# Patient Record
Sex: FEMALE | Race: WHITE | NOT HISPANIC OR LATINO | Employment: OTHER | URBAN - METROPOLITAN AREA
[De-identification: names, ages, dates, MRNs, and addresses within clinical notes are randomized per-mention and may not be internally consistent; named-entity substitution may affect disease eponyms.]

---

## 2018-09-25 ENCOUNTER — OFFICE VISIT (OUTPATIENT)
Dept: FAMILY MEDICINE | Facility: CLINIC | Age: 58
End: 2018-09-25
Payer: COMMERCIAL

## 2018-09-25 VITALS
HEART RATE: 69 BPM | DIASTOLIC BLOOD PRESSURE: 80 MMHG | TEMPERATURE: 98 F | RESPIRATION RATE: 16 BRPM | OXYGEN SATURATION: 98 % | HEIGHT: 67 IN | WEIGHT: 119.8 LBS | BODY MASS INDEX: 18.8 KG/M2 | SYSTOLIC BLOOD PRESSURE: 98 MMHG

## 2018-09-25 DIAGNOSIS — Z00.00 ENCOUNTER FOR GENERAL ADULT MEDICAL EXAMINATION WITHOUT ABNORMAL FINDINGS: Primary | ICD-10-CM

## 2018-09-25 DIAGNOSIS — I10 ESSENTIAL HYPERTENSION: ICD-10-CM

## 2018-09-25 DIAGNOSIS — E03.9 ACQUIRED HYPOTHYROIDISM: ICD-10-CM

## 2018-09-25 PROBLEM — D89.813: Status: ACTIVE | Noted: 2017-10-26

## 2018-09-25 PROBLEM — Z94.81 S/P ALLOGENEIC BONE MARROW TRANSPLANT (CMS/HCC): Status: ACTIVE | Noted: 2018-01-24

## 2018-09-25 PROCEDURE — 99396 PREV VISIT EST AGE 40-64: CPT | Performed by: FAMILY MEDICINE

## 2018-09-25 RX ORDER — ACYCLOVIR 800 MG/1
800 TABLET ORAL
COMMUNITY
Start: 2018-05-15 | End: 2019-06-29 | Stop reason: ALTCHOICE

## 2018-09-25 RX ORDER — HYDROCHLOROTHIAZIDE 25 MG/1
25 TABLET ORAL
COMMUNITY
Start: 2018-08-28 | End: 2019-06-29 | Stop reason: ALTCHOICE

## 2018-09-25 RX ORDER — LEVOTHYROXINE SODIUM 88 UG/1
TABLET ORAL
COMMUNITY
Start: 2015-09-09 | End: 2020-04-20 | Stop reason: SDUPTHER

## 2018-09-25 RX ORDER — LORAZEPAM 0.5 MG/1
0.5 TABLET ORAL
COMMUNITY
Start: 2016-07-01 | End: 2019-06-29 | Stop reason: ALTCHOICE

## 2018-09-25 RX ORDER — ANASTROZOLE 1 MG/1
TABLET ORAL
COMMUNITY
End: 2019-07-23 | Stop reason: ALTCHOICE

## 2018-09-25 RX ORDER — FLUTICASONE PROPIONATE 50 MCG
2 SPRAY, SUSPENSION (ML) NASAL
COMMUNITY
Start: 2017-11-22 | End: 2019-06-29 | Stop reason: ALTCHOICE

## 2018-09-25 RX ORDER — LANSOPRAZOLE 30 MG/1
30 CAPSULE, DELAYED RELEASE ORAL
COMMUNITY
Start: 2018-01-02 | End: 2019-06-29 | Stop reason: ALTCHOICE

## 2018-09-25 RX ORDER — LEVOTHYROXINE SODIUM 88 UG/1
88 TABLET ORAL DAILY
COMMUNITY
Start: 2018-03-30 | End: 2018-09-25 | Stop reason: SDUPTHER

## 2018-09-25 RX ORDER — LISINOPRIL 5 MG/1
5 TABLET ORAL
COMMUNITY
Start: 2017-11-09 | End: 2019-06-29 | Stop reason: ALTCHOICE

## 2018-09-25 ASSESSMENT — ENCOUNTER SYMPTOMS
CHILLS: 0
LIGHT-HEADEDNESS: 0
NERVOUS/ANXIOUS: 0
DYSURIA: 0
FREQUENCY: 0
NAUSEA: 0
DIZZINESS: 0
SLEEP DISTURBANCE: 0
FEVER: 0
DIARRHEA: 0
VOMITING: 0
ADENOPATHY: 0
SORE THROAT: 0
CONSTIPATION: 0
ARTHRALGIAS: 0
BACK PAIN: 0
FATIGUE: 1
RHINORRHEA: 0
COUGH: 0
EYE PAIN: 0
PALPITATIONS: 0
SHORTNESS OF BREATH: 0
HEADACHES: 0
WHEEZING: 0

## 2018-09-25 NOTE — ASSESSMENT & PLAN NOTE
Discontinue HCTZ, continue lisinopril 5 mg daily and check home BP regularly. If readings >140/90 then call.

## 2018-09-25 NOTE — PROGRESS NOTES
"Subjective      Patient ID: Toño Knight is a 58 y.o. female.  1960      HPI    Pt presents for wellness. She was last seen in 2015, found to have low WBC / platelet counts, worked up at [facility] and found to have MDS, had bone marrow transplant. 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      She has upcoming labs through Heme / Onc, including TSH.     Hypothyroidism treated with levothyroxine 88 mcg daily.     Home BP's have been low, 100s/70s. She is on HCTZ and lisinopril.     The following have been reviewed and updated as appropriate in this visit:  Allergies  Meds  Problems       Review of Systems   Constitutional: Positive for fatigue. Negative for chills and fever.   HENT: Negative for congestion, ear pain, rhinorrhea and sore throat.    Eyes: Negative for pain.   Respiratory: Negative for cough, shortness of breath and wheezing.    Cardiovascular: Negative for chest pain and palpitations.   Gastrointestinal: Negative for constipation, diarrhea, nausea and vomiting.   Genitourinary: Negative for dysuria and frequency.   Musculoskeletal: Negative for arthralgias and back pain.   Skin: Negative for rash.   Neurological: Negative for dizziness, light-headedness and headaches.   Hematological: Negative for adenopathy.   Psychiatric/Behavioral: Negative for behavioral problems and sleep disturbance. The patient is not nervous/anxious.        Objective     Vitals:    09/25/18 0817   BP: 98/80   Pulse: 69   Resp: 16   Temp: 36.7 °C (98 °F)   TempSrc: Oral   SpO2: 98%   Weight: 54.3 kg (119 lb 12.8 oz)   Height: 1.702 m (5' 7\")     Body mass index is 18.76 kg/m².    Physical Exam   Constitutional: She is oriented to person, place, and time. She appears well-developed and well-nourished. No distress.   HENT:   Head: Normocephalic and atraumatic.   Cardiovascular: Normal rate, regular rhythm, normal heart sounds and intact distal pulses.  Exam reveals no gallop and no friction rub.    No murmur " heard.  Pulmonary/Chest: Effort normal and breath sounds normal. No respiratory distress. She has no wheezes. She has no rales.   Musculoskeletal: She exhibits no edema or deformity.   Neurological: She is alert and oriented to person, place, and time. No cranial nerve deficit. Coordination normal.   Skin: No rash noted. She is not diaphoretic.   Psychiatric: She has a normal mood and affect. Her behavior is normal. Judgment and thought content normal.   Vitals reviewed.      Assessment/Plan   Problem List Items Addressed This Visit     Essential hypertension     Discontinue HCTZ, continue lisinopril 5 mg daily and check home BP regularly. If readings >140/90 then call.          Acquired hypothyroidism     Continue levothyroxine 88 mcg daily, send TSH labs to our office.           Other Visit Diagnoses     Encounter for general adult medical examination without abnormal findings    -  Primary      Colonoscopy up to date 2009, due next summer. S/P double mastectomy so no mammo due.    Schedule OB/GYN or with our office for PAP.     Have flu vaccine at Heme/Onc office or at our office.     Please have labs forwarded.

## 2018-09-25 NOTE — PATIENT INSTRUCTIONS
Colonoscopy up to date 2009, due next summer.     Schedule OB/GYN or with our office for PAP.     Have flu vaccine at Heme/Onc office or at our office. Also, recommend new shingles vaccine (Shingrix). Should discuss with Dr. Can.     Discontinue HCTZ, continue lisinopril 5 mg daily and check home BP regularly. If readings >140/90 then call.     Please have labs forwarded.

## 2019-01-31 ENCOUNTER — TELEPHONE (OUTPATIENT)
Dept: FAMILY MEDICINE | Facility: CLINIC | Age: 59
End: 2019-01-31

## 2019-06-29 ENCOUNTER — HOSPITAL ENCOUNTER (OUTPATIENT)
Facility: CLINIC | Age: 59
Discharge: HOME | End: 2019-06-29
Attending: EMERGENCY MEDICINE
Payer: COMMERCIAL

## 2019-06-29 VITALS
HEIGHT: 68 IN | RESPIRATION RATE: 16 BRPM | SYSTOLIC BLOOD PRESSURE: 180 MMHG | WEIGHT: 122 LBS | DIASTOLIC BLOOD PRESSURE: 90 MMHG | TEMPERATURE: 97.5 F | BODY MASS INDEX: 18.49 KG/M2 | HEART RATE: 68 BPM | OXYGEN SATURATION: 99 %

## 2019-06-29 DIAGNOSIS — N39.0 URINARY TRACT INFECTION IN FEMALE: Primary | ICD-10-CM

## 2019-06-29 LAB
BILIRUBIN, POC: NEGATIVE
BLOOD URINE, POC: ABNORMAL
CLARITY, POC: CLEAR
COLOR, POC: YELLOW
GLUCOSE URINE, POC: NEGATIVE
KETONES, POC: NEGATIVE
LEUKOCYTE EST, POC: ABNORMAL
NITRITE, POC: NEGATIVE
PH, POC: 6.5
PROTEIN, POC: NEGATIVE
SPECIFIC GRAVITY, POC: 1
UROBILINOGEN, POC: 0.2

## 2019-06-29 PROCEDURE — S9083 URGENT CARE CENTER GLOBAL: HCPCS | Performed by: EMERGENCY MEDICINE

## 2019-06-29 PROCEDURE — 81002 URINALYSIS NONAUTO W/O SCOPE: CPT | Performed by: EMERGENCY MEDICINE

## 2019-06-29 PROCEDURE — 99213 OFFICE O/P EST LOW 20 MIN: CPT | Performed by: EMERGENCY MEDICINE

## 2019-06-29 RX ORDER — NITROFURANTOIN 25; 75 MG/1; MG/1
100 CAPSULE ORAL 2 TIMES DAILY
Qty: 14 CAPSULE | Refills: 0 | Status: SHIPPED | OUTPATIENT
Start: 2019-06-29 | End: 2019-07-23 | Stop reason: ALTCHOICE

## 2019-06-29 ASSESSMENT — ENCOUNTER SYMPTOMS
ARTHRALGIAS: 0
VOMITING: 0
BACK PAIN: 0
NAUSEA: 0
FREQUENCY: 1
SHORTNESS OF BREATH: 0
COLOR CHANGE: 0
WEAKNESS: 0
COUGH: 0
PALPITATIONS: 0
ABDOMINAL PAIN: 0
ADENOPATHY: 0
EYE PAIN: 0
DYSURIA: 0
CHILLS: 0
HEMATURIA: 0
SORE THROAT: 0
FEVER: 0

## 2019-06-29 NOTE — ED PROVIDER NOTES
History  No chief complaint on file.    60 yo female presents with increased urinary frequency and urgency x 3 days. The patient says she has had the urge to go to the bathroom several times an hour. No dysuria or gross hematuria. No fevers/chills. She denies N/V. No back, flank, or abdominal pain. No vaginal bleeding or discharge. No new sexual partners. Symptoms are consistent with past UTIs. No other complaints.            No past medical history on file.    No past surgical history on file.    No family history on file.    Social History   Substance Use Topics   • Smoking status: Never Smoker   • Smokeless tobacco: Never Used   • Alcohol use No       Review of Systems   Constitutional: Negative for chills and fever.   HENT: Negative for ear pain and sore throat.    Eyes: Negative for pain and visual disturbance.   Respiratory: Negative for cough and shortness of breath.    Cardiovascular: Negative for chest pain and palpitations.   Gastrointestinal: Negative for abdominal pain, nausea and vomiting.   Genitourinary: Positive for frequency and urgency. Negative for dysuria and hematuria.   Musculoskeletal: Negative for arthralgias and back pain.   Skin: Negative for color change and rash.   Neurological: Negative for weakness.   Hematological: Negative for adenopathy.   All other systems reviewed and are negative.      Physical Exam  ED Triage Vitals   Temp Pulse Resp BP SpO2   -- -- -- -- --      Temp src Heart Rate Source Patient Position BP Location FiO2 (%) (Set)   -- -- -- -- --       Physical Exam   Constitutional: She appears well-developed and well-nourished. No distress.   HENT:   Head: Normocephalic and atraumatic.   Eyes: Conjunctivae are normal.   Neck: Neck supple.   Cardiovascular: Normal rate and regular rhythm.    No murmur heard.  Pulmonary/Chest: Effort normal and breath sounds normal. No respiratory distress.   Abdominal: Soft. There is no tenderness. There is no CVA tenderness.  "  Musculoskeletal: She exhibits no edema.   Neurological: She is alert.   Skin: Skin is warm and dry.   Psychiatric: She has a normal mood and affect.   Nursing note and vitals reviewed.        Procedures  Procedures    UC Course  Clinical Impressions as of Jun 29 1618   Urinary tract infection in female       MDM  Number of Diagnoses or Management Options  Urinary tract infection in female:   Diagnosis management comments: The patient is very well appearing with a benign exam. BP moderately elevated but she says she has \"white coat syndrome\" and always has elevated readings when she goes to the doctor's. Symptoms and urine dip are consistent with an uncomplicated UTI. Plan for a course of Macrobid and PCP follow up next week. The patient is agreeable to this plan. Strict return precautions provided.    Patient Progress  Patient progress: stable                 Don Parmar MD  06/29/19 0183    "

## 2019-07-23 ENCOUNTER — OFFICE VISIT (OUTPATIENT)
Dept: FAMILY MEDICINE | Facility: CLINIC | Age: 59
End: 2019-07-23
Payer: COMMERCIAL

## 2019-07-23 VITALS
WEIGHT: 122 LBS | OXYGEN SATURATION: 95 % | SYSTOLIC BLOOD PRESSURE: 138 MMHG | RESPIRATION RATE: 16 BRPM | HEART RATE: 65 BPM | DIASTOLIC BLOOD PRESSURE: 78 MMHG | BODY MASS INDEX: 18.49 KG/M2 | HEIGHT: 68 IN | TEMPERATURE: 97.9 F

## 2019-07-23 DIAGNOSIS — L23.89 ALLERGIC CONTACT DERMATITIS DUE TO OTHER AGENTS: Primary | ICD-10-CM

## 2019-07-23 PROBLEM — R53.81 PHYSICAL DECONDITIONING: Status: ACTIVE | Noted: 2018-09-27

## 2019-07-23 PROCEDURE — 99214 OFFICE O/P EST MOD 30 MIN: CPT | Performed by: FAMILY MEDICINE

## 2019-07-23 RX ORDER — PREDNISONE 20 MG/1
TABLET ORAL
Qty: 11 TABLET | Refills: 0 | Status: SHIPPED | OUTPATIENT
Start: 2019-07-23 | End: 2019-12-27

## 2019-07-23 RX ORDER — CALCIUM CARBONATE/VITAMIN D3 600 MG-10
1 TABLET ORAL
Refills: 1 | COMMUNITY
Start: 2019-04-17 | End: 2019-07-23 | Stop reason: ALTCHOICE

## 2019-07-23 RX ORDER — ERGOCALCIFEROL 1.25 MG/1
CAPSULE ORAL
Refills: 1 | COMMUNITY
Start: 2019-04-17 | End: 2019-07-23 | Stop reason: ALTCHOICE

## 2019-07-23 RX ORDER — HYDROXYZINE HYDROCHLORIDE 10 MG/1
10 TABLET, FILM COATED ORAL 3 TIMES DAILY PRN
Qty: 30 TABLET | Refills: 0 | Status: SHIPPED | OUTPATIENT
Start: 2019-07-23 | End: 2019-12-27

## 2019-07-23 NOTE — PROGRESS NOTES
Rash on the body for 3 days.   Rash History: can't think of anything new   It is localized to the abdomen, back and the arms.   It yes itchy.   It no painful.   Associated symptoms: none.   Redness? yes  Scaling? no    Rash exposures: none she can think of   Has take benadryl with no relief.   All other systems reviewed and negative except as noted in the HPI.   No past medical history on file.   Patient Active Problem List   Diagnosis   • Malignant neoplasm of breast (CMS/HCC) (HCC)   • Breast cancer (CMS/HCC) (HCC)   • Bacteremia   • Complication of bone marrow transplant (CMS/HCC) (HCC)   • Mjlhy-dgrkvc-ryiw disease (CMS/HCC) (HCC)   • Essential hypertension   • Hypoglobulinemia   • Acquired hypothyroidism   • Infection due to Enterobacter cloacae   • Liver lesion   • MDS (myelodysplastic syndrome) (CMS/HCC) (HCC)   • Neutropenia (CMS/HCC) (HCC)   • Osteopenia   • Other specified acquired hypothyroidism   • Pancytopenia (CMS/HCC) (HCC)   • S/P allogeneic bone marrow transplant (CMS/HCC) (HCC)   • Thrombocytopenia (CMS/HCC) (HCC)   • Thyroid disease   • Urinary tract infection without hematuria   • Vitamin D deficiency   • Physical deconditioning      No past surgical history on file.   No family history on file.   Social History     Social History   • Marital status: Single     Spouse name: N/A   • Number of children: N/A   • Years of education: N/A     Social History Main Topics   • Smoking status: Never Smoker   • Smokeless tobacco: Never Used   • Alcohol use No   • Drug use: Unknown   • Sexual activity: Not Asked     Other Topics Concern   • None     Social History Narrative   • None        Current Outpatient Prescriptions:   •  levothyroxine (SYNTHROID) 88 mcg tablet, take 1 tablet by oral route  every day, Disp: , Rfl:   •  hydrOXYzine (ATARAX) 10 mg tablet, Take 1 tablet (10 mg total) by mouth 3 (three) times a day as needed for itching for up to 10 days., Disp: 30 tablet, Rfl: 0  •  predniSONE (DELTASONE)  "20 mg tablet, Take 2 tabs x 3 days, take 1 tab x 3 days, take 1/2 tab x 4 days, Disp: 11 tablet, Rfl: 0   Allergies   Allergen Reactions   • No Known Drug Allergies         /78 (BP Location: Right upper arm, Patient Position: Sitting)   Pulse 65   Temp 36.6 °C (97.9 °F) (Temporal)   Resp 16   Ht 1.727 m (5' 8\")   Wt 55.3 kg (122 lb)   SpO2 95%   BMI 18.55 kg/m²    no apparent distress   Alert and well appearing   Awake and oriented with normal affect and appropriate behavior   HEENT: Normocephalic and atraumatic, pupils equal, round, and reactive to light, extraocular movements intact, OP clear with moist mucous membranes  Lungs: Normal breath sounds, lungs CTA with no RRW  Heart:Regular rate rhythm with no murmurs, gallops or rubs  Ext: No clubbing, cyanosis or edema   Rash description: multiple areas of macular papular lesion in linear clumps on arms b/l and scattered on back with dermatographia    1. Allergic contact dermatitis due to other agents  Patient will notify me with any questions or issues. Educated patient on newly prescribed medication including side effects. Patient verbalized understanding.  Treatment goals reviewed   Sarna  Follow up for worse symptoms.   - predniSONE (DELTASONE) 20 mg tablet; Take 2 tabs x 3 days, take 1 tab x 3 days, take 1/2 tab x 4 days  Dispense: 11 tablet; Refill: 0  - hydrOXYzine (ATARAX) 10 mg tablet; Take 1 tablet (10 mg total) by mouth 3 (three) times a day as needed for itching for up to 10 days.  Dispense: 30 tablet; Refill: 0            "

## 2019-10-17 ENCOUNTER — TRANSCRIBE ORDERS (OUTPATIENT)
Dept: SCHEDULING | Age: 59
End: 2019-10-17

## 2019-10-17 DIAGNOSIS — Z94.81 BONE MARROW TRANSPLANT STATUS (CMS/HCC): Primary | ICD-10-CM

## 2019-10-24 ENCOUNTER — HOSPITAL ENCOUNTER (OUTPATIENT)
Dept: RADIOLOGY | Facility: HOSPITAL | Age: 59
Discharge: HOME | End: 2019-10-24
Attending: INTERNAL MEDICINE
Payer: COMMERCIAL

## 2019-10-24 DIAGNOSIS — Z94.81 BONE MARROW TRANSPLANT STATUS (CMS/HCC): ICD-10-CM

## 2019-10-24 PROCEDURE — 77080 DXA BONE DENSITY AXIAL: CPT

## 2019-12-27 ENCOUNTER — OFFICE VISIT (OUTPATIENT)
Dept: FAMILY MEDICINE | Facility: CLINIC | Age: 59
End: 2019-12-27
Payer: COMMERCIAL

## 2019-12-27 VITALS
SYSTOLIC BLOOD PRESSURE: 118 MMHG | HEIGHT: 68 IN | HEART RATE: 72 BPM | BODY MASS INDEX: 18.64 KG/M2 | OXYGEN SATURATION: 99 % | TEMPERATURE: 97 F | RESPIRATION RATE: 16 BRPM | WEIGHT: 123 LBS | DIASTOLIC BLOOD PRESSURE: 70 MMHG

## 2019-12-27 DIAGNOSIS — C50.919 MALIGNANT NEOPLASM OF FEMALE BREAST, UNSPECIFIED ESTROGEN RECEPTOR STATUS, UNSPECIFIED LATERALITY, UNSPECIFIED SITE OF BREAST (CMS/HCC): ICD-10-CM

## 2019-12-27 DIAGNOSIS — Z00.00 ENCOUNTER FOR GENERAL ADULT MEDICAL EXAMINATION WITHOUT ABNORMAL FINDINGS: Primary | ICD-10-CM

## 2019-12-27 DIAGNOSIS — E03.9 ACQUIRED HYPOTHYROIDISM: ICD-10-CM

## 2019-12-27 DIAGNOSIS — Z94.81 S/P ALLOGENEIC BONE MARROW TRANSPLANT (CMS/HCC): ICD-10-CM

## 2019-12-27 DIAGNOSIS — D89.813: ICD-10-CM

## 2019-12-27 DIAGNOSIS — D46.9 MDS (MYELODYSPLASTIC SYNDROME) (CMS/HCC): ICD-10-CM

## 2019-12-27 DIAGNOSIS — T86.09 OTHER COMPLICATION OF BONE MARROW TRANSPLANT: ICD-10-CM

## 2019-12-27 DIAGNOSIS — E55.9 VITAMIN D DEFICIENCY: ICD-10-CM

## 2019-12-27 PROBLEM — R53.81 PHYSICAL DECONDITIONING: Status: RESOLVED | Noted: 2018-09-27 | Resolved: 2019-12-27

## 2019-12-27 PROBLEM — I10 ESSENTIAL HYPERTENSION: Status: RESOLVED | Noted: 2017-06-01 | Resolved: 2019-12-27

## 2019-12-27 PROCEDURE — 99396 PREV VISIT EST AGE 40-64: CPT | Performed by: FAMILY MEDICINE

## 2019-12-27 RX ORDER — ALENDRONATE SODIUM 70 MG/1
TABLET ORAL
Refills: 3 | COMMUNITY
Start: 2019-12-13

## 2019-12-27 ASSESSMENT — ENCOUNTER SYMPTOMS
COUGH: 0
WHEEZING: 0
SORE THROAT: 0
DYSURIA: 0
PALPITATIONS: 0
CONSTIPATION: 0
LIGHT-HEADEDNESS: 0
VOMITING: 0
SLEEP DISTURBANCE: 0
FATIGUE: 1
HEADACHES: 0
RHINORRHEA: 0
DIARRHEA: 0
DIZZINESS: 0
SHORTNESS OF BREATH: 0
NAUSEA: 0
CHILLS: 0
BACK PAIN: 0
FREQUENCY: 0
EYE PAIN: 0
ADENOPATHY: 0
ARTHRALGIAS: 0
FEVER: 0
NERVOUS/ANXIOUS: 0

## 2019-12-27 NOTE — PROGRESS NOTES
"Subjective      Patient ID: Toño Knight is a 59 y.o. female.  1960      HPI    Pt presents for wellness.     MDS-- had bone marrow transplant. She had graft-host-disease in 2017.     She has upcoming labs through Heme / Onc, including TSH.     Hypothyroidism treated with levothyroxine 88 mcg daily.      The following have been reviewed and updated as appropriate in this visit:  Allergies  Meds  Problems       Review of Systems   Constitutional: Positive for fatigue. Negative for chills and fever.   HENT: Negative for congestion, ear pain, rhinorrhea and sore throat.    Eyes: Negative for pain.   Respiratory: Negative for cough, shortness of breath and wheezing.    Cardiovascular: Negative for chest pain and palpitations.   Gastrointestinal: Negative for constipation, diarrhea, nausea and vomiting.   Genitourinary: Negative for dysuria and frequency.   Musculoskeletal: Negative for arthralgias and back pain.   Skin: Negative for rash.   Neurological: Negative for dizziness, light-headedness and headaches.   Hematological: Negative for adenopathy.   Psychiatric/Behavioral: Negative for behavioral problems and sleep disturbance. The patient is not nervous/anxious.        Objective     Vitals:    12/27/19 1053   BP: 118/70   BP Location: Right upper arm   Patient Position: Sitting   Pulse: 72   Resp: 16   Temp: 36.1 °C (97 °F)   TempSrc: Temporal   SpO2: 99%   Weight: 55.8 kg (123 lb)   Height: 1.727 m (5' 8\")     Body mass index is 18.7 kg/m².    Physical Exam   Constitutional: She is oriented to person, place, and time. She appears well-developed and well-nourished. No distress.   HENT:   Head: Normocephalic and atraumatic.   Cardiovascular: Normal rate, regular rhythm, normal heart sounds and intact distal pulses. Exam reveals no gallop and no friction rub.   No murmur heard.  Pulmonary/Chest: Effort normal and breath sounds normal. No respiratory distress. She has no wheezes. She has no rales. "   Musculoskeletal: She exhibits no edema or deformity.   Neurological: She is alert and oriented to person, place, and time. No cranial nerve deficit. Coordination normal.   Skin: No rash noted. She is not diaphoretic.   Psychiatric: She has a normal mood and affect. Her behavior is normal. Judgment and thought content normal.   Vitals reviewed.      Assessment/Plan   Problem List Items Addressed This Visit        Digestive    Vitamin D deficiency    Relevant Orders    Vitamin D 25 hydroxy       Endocrine/Metabolic    Acquired hypothyroidism    Relevant Orders    TSH 3rd Generation       Hematologic    Complication of bone marrow transplant (CMS/HCC)    MDS (myelodysplastic syndrome) (CMS/HCC)     Stable, routine labs checked through heme/onc every 6 months.            Immune    Mlkug-gnmhrn-eknt disease (CMS/HCC)     Stable.            Other    Malignant neoplasm of breast (CMS/HCC)     Stable, s/p b/l mastectomy         S/P allogeneic bone marrow transplant (CMS/HCC)     Continue heme /onc visits.           Other Visit Diagnoses     Encounter for general adult medical examination without abnormal findings    -  Primary    Relevant Orders    Lipid panel    Comprehensive metabolic panel    CBC and Differential      Colonoscopy up to date 2019. S/P double mastectomy so no mammo due.    Will repeat labs / Thyroid / lipids. Hep C neg 2017.     Up to date with OB/GYN, PAP / DEXA done.    Declined flu vaccine this year.

## 2019-12-31 LAB
25(OH)D3 SERPL-MCNC: 32 NG/ML (ref 30–100)
ALBUMIN SERPL-MCNC: 4.2 G/DL (ref 3.6–5.1)
ALBUMIN/GLOB SERPL: 2.5 (CALC) (ref 1–2.5)
ALP SERPL-CCNC: 72 U/L (ref 33–130)
ALT SERPL-CCNC: 15 U/L (ref 6–29)
AST SERPL-CCNC: 18 U/L (ref 10–35)
BASOPHILS # BLD AUTO: 73 CELLS/UL (ref 0–200)
BASOPHILS NFR BLD AUTO: 1.1 %
BILIRUB SERPL-MCNC: 0.5 MG/DL (ref 0.2–1.2)
BUN SERPL-MCNC: 19 MG/DL (ref 7–25)
BUN/CREAT SERPL: 18 (CALC) (ref 6–22)
CALCIUM SERPL-MCNC: 9.1 MG/DL (ref 8.6–10.4)
CHLORIDE SERPL-SCNC: 105 MMOL/L (ref 98–110)
CHOLEST SERPL-MCNC: 227 MG/DL
CHOLEST/HDLC SERPL: 4.5 (CALC)
CO2 SERPL-SCNC: 28 MMOL/L (ref 20–32)
CREAT SERPL-MCNC: 1.08 MG/DL (ref 0.5–0.99)
EOSINOPHIL # BLD AUTO: 310 CELLS/UL (ref 15–500)
EOSINOPHIL NFR BLD AUTO: 4.7 %
ERYTHROCYTE [DISTWIDTH] IN BLOOD BY AUTOMATED COUNT: 12.8 % (ref 11–15)
GLOBULIN SER CALC-MCNC: 1.7 G/DL (CALC) (ref 1.9–3.7)
GLUCOSE SERPL-MCNC: 82 MG/DL (ref 65–99)
HCT VFR BLD AUTO: 37.4 % (ref 35–45)
HDLC SERPL-MCNC: 51 MG/DL
HGB BLD-MCNC: 12.9 G/DL (ref 11.7–15.5)
LDLC SERPL CALC-MCNC: 159 MG/DL (CALC)
LYMPHOCYTES # BLD AUTO: 1003 CELLS/UL (ref 850–3900)
LYMPHOCYTES NFR BLD AUTO: 15.2 %
MCH RBC QN AUTO: 31.9 PG (ref 27–33)
MCHC RBC AUTO-ENTMCNC: 34.5 G/DL (ref 32–36)
MCV RBC AUTO: 92.3 FL (ref 80–100)
MONOCYTES # BLD AUTO: 640 CELLS/UL (ref 200–950)
MONOCYTES NFR BLD AUTO: 9.7 %
NEUTROPHILS # BLD AUTO: 4574 CELLS/UL (ref 1500–7800)
NEUTROPHILS NFR BLD AUTO: 69.3 %
NONHDLC SERPL-MCNC: 176 MG/DL (CALC)
PLATELET # BLD AUTO: 263 THOUSAND/UL (ref 140–400)
PMV BLD REES-ECKER: 8.6 FL (ref 7.5–12.5)
POTASSIUM SERPL-SCNC: 4.2 MMOL/L (ref 3.5–5.3)
PROT SERPL-MCNC: 5.9 G/DL (ref 6.1–8.1)
QUEST EGFR NON-AFR. AMERICAN: 56 ML/MIN/1.73M2
RBC # BLD AUTO: 4.05 MILLION/UL (ref 3.8–5.1)
SODIUM SERPL-SCNC: 140 MMOL/L (ref 135–146)
TRIGL SERPL-MCNC: 71 MG/DL
TSH SERPL-ACNC: 1.18 MIU/L (ref 0.4–4.5)
WBC # BLD AUTO: 6.6 THOUSAND/UL (ref 3.8–10.8)

## 2019-12-31 NOTE — RESULT ENCOUNTER NOTE
Toño,    Your blood work shows elevated cholesterol at 227 total, mildly abnormal kidney function, may be due to fasting with some mild dehydration.  Vitamin D is in low normal range, thyroid level is fine, blood counts all normal.  Recommend watching cholesterol foods and trying to limit those as much as possible.  We can follow-up on the cholesterol and labs again next year.  Take care.    Dr. Mcwilliams

## 2020-02-19 ENCOUNTER — OFFICE VISIT (OUTPATIENT)
Dept: FAMILY MEDICINE | Facility: CLINIC | Age: 60
End: 2020-02-19
Payer: COMMERCIAL

## 2020-02-19 VITALS
WEIGHT: 122 LBS | SYSTOLIC BLOOD PRESSURE: 96 MMHG | BODY MASS INDEX: 18.49 KG/M2 | OXYGEN SATURATION: 99 % | HEIGHT: 68 IN | TEMPERATURE: 99.4 F | HEART RATE: 75 BPM | DIASTOLIC BLOOD PRESSURE: 70 MMHG | RESPIRATION RATE: 16 BRPM

## 2020-02-19 DIAGNOSIS — J10.1 INFLUENZA A: Primary | ICD-10-CM

## 2020-02-19 LAB
RAPID INFLUENZA A AGN: POSITIVE
RAPID INFLUENZA B AGN: NEGATIVE

## 2020-02-19 PROCEDURE — 87804 INFLUENZA ASSAY W/OPTIC: CPT | Performed by: FAMILY MEDICINE

## 2020-02-19 PROCEDURE — 99214 OFFICE O/P EST MOD 30 MIN: CPT | Performed by: FAMILY MEDICINE

## 2020-02-19 RX ORDER — OSELTAMIVIR PHOSPHATE 75 MG/1
75 CAPSULE ORAL 2 TIMES DAILY
Qty: 10 CAPSULE | Refills: 0 | Status: SHIPPED | OUTPATIENT
Start: 2020-02-19 | End: 2020-02-24

## 2020-02-19 ASSESSMENT — ENCOUNTER SYMPTOMS
NAUSEA: 1
DYSURIA: 0
VOMITING: 0
RHINORRHEA: 0
BACK PAIN: 0
PALPITATIONS: 0
WHEEZING: 0
CONSTIPATION: 0
CHILLS: 1
FEVER: 1
HEADACHES: 0
FATIGUE: 1
COUGH: 1
LIGHT-HEADEDNESS: 0
DIARRHEA: 0
DIZZINESS: 0
ADENOPATHY: 0
NERVOUS/ANXIOUS: 0
SHORTNESS OF BREATH: 0
EYE PAIN: 0
SORE THROAT: 0
ARTHRALGIAS: 0
SLEEP DISTURBANCE: 0
FREQUENCY: 0

## 2020-02-19 NOTE — PROGRESS NOTES
"Subjective      Patient ID: Toño Knight is a 60 y.o. female.  1960      HPI    Patient presents due to fever and body aches for the past 2 to 3 days.  She has been increasing fluids, taking Tylenol as needed.    The following have been reviewed and updated as appropriate in this visit:       Review of Systems   Constitutional: Positive for chills, fatigue and fever.   HENT: Positive for congestion. Negative for ear pain, rhinorrhea and sore throat.    Eyes: Negative for pain.   Respiratory: Positive for cough. Negative for shortness of breath and wheezing.    Cardiovascular: Negative for chest pain and palpitations.   Gastrointestinal: Positive for nausea. Negative for constipation, diarrhea and vomiting.   Genitourinary: Negative for dysuria and frequency.   Musculoskeletal: Negative for arthralgias and back pain.   Skin: Negative for rash.   Neurological: Negative for dizziness, light-headedness and headaches.   Hematological: Negative for adenopathy.   Psychiatric/Behavioral: Negative for behavioral problems and sleep disturbance. The patient is not nervous/anxious.        Objective     Vitals:    02/19/20 1340   BP: 96/70   BP Location: Left upper arm   Patient Position: Sitting   Pulse: 75   Resp: 16   Temp: 37.4 °C (99.4 °F)   TempSrc: Oral   SpO2: 99%   Weight: 55.3 kg (122 lb)   Height: 1.727 m (5' 8\")     Body mass index is 18.55 kg/m².    Physical Exam   Constitutional: She is oriented to person, place, and time. She appears well-developed and well-nourished. She appears lethargic. She appears ill. No distress.   HENT:   Head: Normocephalic and atraumatic.   Right Ear: Hearing, tympanic membrane, external ear and ear canal normal.   Left Ear: Hearing, tympanic membrane, external ear and ear canal normal.   Nose: No mucosal edema. Right sinus exhibits no maxillary sinus tenderness and no frontal sinus tenderness. Left sinus exhibits no maxillary sinus tenderness and no frontal sinus tenderness. "   Mouth/Throat: No oropharyngeal exudate or posterior oropharyngeal erythema.   Eyes: Pupils are equal, round, and reactive to light. Conjunctivae and EOM are normal. Right eye exhibits no discharge. Left eye exhibits no discharge.   Neck: Normal range of motion. Neck supple.   Cardiovascular: Normal rate, regular rhythm, normal heart sounds and intact distal pulses. Exam reveals no gallop and no friction rub.   No murmur heard.  Pulmonary/Chest: Effort normal and breath sounds normal. No respiratory distress. She has no wheezes. She has no rales.   Musculoskeletal: She exhibits no edema or deformity.   Lymphadenopathy:     She has no cervical adenopathy.   Neurological: She is oriented to person, place, and time. She appears lethargic. No cranial nerve deficit. Coordination normal.   Skin: No rash noted. She is not diaphoretic.   Psychiatric: She has a normal mood and affect. Her behavior is normal. Judgment and thought content normal.   Vitals reviewed.      Assessment/Plan   Diagnoses and all orders for this visit:    Influenza A (Primary)  -     POCT Influenza A/B    Other orders  -     oseltamivir (TAMIFLU) 75 mg capsule; Take 1 capsule (75 mg total) by mouth 2 (two) times a day for 5 days.      Flu A swab positive in office today.    We will start Tamiflu since between 48 and 72-hour window and medical history of MDS.  Patient agreeable with plan and we discussed side effects.  Continue with hydration, Tylenol as needed.

## 2020-10-13 RX ORDER — LEVOTHYROXINE SODIUM 88 UG/1
TABLET ORAL
Qty: 90 TABLET | Refills: 0 | Status: SHIPPED | OUTPATIENT
Start: 2020-10-13 | End: 2020-11-24

## 2020-10-13 NOTE — TELEPHONE ENCOUNTER
Medicine Refill Request    Last Office Visit: 2/19/2020  Last Telemedicine Visit: Visit date not found    Next Office Visit: Visit date not found  Next Telemedicine Visit: Visit date not found         Current Outpatient Medications:   •  alendronate (FOSAMAX) 70 mg tablet, TAKE 1 TABLET ONCE A WEEK, Disp: , Rfl: 3  •  levothyroxine (SYNTHROID) 88 mcg tablet, Take 1 tablet (88 mcg total) by mouth daily., Disp: 90 tablet, Rfl: 1      BP Readings from Last 3 Encounters:   02/19/20 96/70   12/27/19 118/70   07/23/19 138/78       Recent Lab results:  Lab Results   Component Value Date    CHOL 227 (H) 12/30/2019   ,   Lab Results   Component Value Date    HDL 51 12/30/2019   ,   Lab Results   Component Value Date    LDLCALC 159 (H) 12/30/2019   ,   Lab Results   Component Value Date    TRIG 71 12/30/2019        Lab Results   Component Value Date    GLUCOSE 82 12/30/2019   , No results found for: HGBA1C      Lab Results   Component Value Date    CREATININE 1.08 (H) 12/30/2019       Lab Results   Component Value Date    TSH 1.18 12/30/2019

## 2020-10-26 ENCOUNTER — DOCUMENTATION (OUTPATIENT)
Dept: FAMILY MEDICINE | Facility: CLINIC | Age: 60
End: 2020-10-26

## 2020-10-26 NOTE — PROGRESS NOTES
Chrissy Barnett MA has completed a chart review for Toño GONZALEZ Bath and have determined that the care gap has been satisfied.    Care Gap Source:: [facility]    Care Gap(s) Identified:: Breast Cancer Screening    Chart Review Completed:: Yes     Patient is exempt from this measure, she had a mastectomy in 2005.

## 2020-11-24 RX ORDER — LEVOTHYROXINE SODIUM 88 UG/1
TABLET ORAL
Qty: 90 TABLET | Refills: 0 | Status: SHIPPED | OUTPATIENT
Start: 2020-11-24 | End: 2021-02-22

## 2020-12-31 ENCOUNTER — OFFICE VISIT (OUTPATIENT)
Dept: FAMILY MEDICINE | Facility: CLINIC | Age: 60
End: 2020-12-31
Payer: COMMERCIAL

## 2020-12-31 VITALS
WEIGHT: 117.5 LBS | HEIGHT: 68 IN | TEMPERATURE: 96.6 F | OXYGEN SATURATION: 98 % | BODY MASS INDEX: 17.81 KG/M2 | SYSTOLIC BLOOD PRESSURE: 118 MMHG | RESPIRATION RATE: 14 BRPM | DIASTOLIC BLOOD PRESSURE: 86 MMHG | HEART RATE: 80 BPM

## 2020-12-31 DIAGNOSIS — E78.00 ELEVATED CHOLESTEROL: ICD-10-CM

## 2020-12-31 DIAGNOSIS — Z13.1 SCREENING FOR DIABETES MELLITUS: ICD-10-CM

## 2020-12-31 DIAGNOSIS — E55.9 VITAMIN D DEFICIENCY: ICD-10-CM

## 2020-12-31 DIAGNOSIS — Z00.00 WELL ADULT EXAM: Primary | ICD-10-CM

## 2020-12-31 DIAGNOSIS — Z12.11 SCREENING FOR COLON CANCER: ICD-10-CM

## 2020-12-31 DIAGNOSIS — E03.9 ACQUIRED HYPOTHYROIDISM: ICD-10-CM

## 2020-12-31 DIAGNOSIS — D46.9 MDS (MYELODYSPLASTIC SYNDROME) (CMS/HCC): ICD-10-CM

## 2020-12-31 PROCEDURE — 99396 PREV VISIT EST AGE 40-64: CPT | Performed by: NURSE PRACTITIONER

## 2020-12-31 NOTE — PROGRESS NOTES
"SUBJECTIVE:   60 y.o. female for annual routine physical, no GYN  Current Outpatient Medications   Medication Sig Dispense Refill   • alendronate (FOSAMAX) 70 mg tablet TAKE 1 TABLET ONCE A WEEK  3   • levothyroxine (SYNTHROID) 88 mcg tablet TAKE 1 TABLET BY MOUTH EVERY DAY 90 tablet 0     No current facility-administered medications for this visit.      Allergies: No known drug allergies   No LMP recorded. Patient is postmenopausal.    New Concerns: recent melanoma removed right calf, had it excised and next appt is Feb  Other doctors:  Oncologist Dr. Ginger Can- appt next week for a check up for MDS originally diagnosed 5 years ago  S/p B/L breast cancer 20 years ago, had B/L mastectomy, chemo and radiation (treated by Dr. Jansen)  Dr. Mariana Miller- derm  Gyn in [facility] saw her and had last pap 2019  Dr. Welch for osteoporosis    Review of risk factors for depression:   Over the past 2 weeks, have you felt down, depressed or hopeless? no  Over the past 2 weeks, have you felt little interest or pleasure in doing things? no     ROS:  Feeling well. No dyspnea or chest pain on exertion.  No abdominal pain, change in bowel habits, black or bloody stools.  No urinary tract symptoms.     Weight: The patient reports no significant weight change.  Diet: healthy balanced diet  Exercise: three times/week  Type: walks in good weather     OBJECTIVE:  Visit Vitals  BP (!) 118/92 (BP Location: Right upper arm, Patient Position: Sitting)   Pulse (!) 104   Temp (!) 35.9 °C (96.6 °F) (Temporal)   Resp 14   Ht 1.715 m (5' 7.5\")   Wt 53.3 kg (117 lb 8 oz)   SpO2 98%   BMI 18.13 kg/m²      Gait:  Normal  Alert and well appearing   Awake and oriented with normal affect and appropriate behavior   HEENT: Normocephalic and atraumatic, pupils equal, round, OP clear with moist mucous membranes  Neck:  supple, no thyroid enlargement, no LAD  Lungs: Normal breath sounds, lungs CTA with no RRW  Heart:Regular rate rhythm with no " murmurs, gallops or rubs   Abd: Soft, non-tender, normal bowel sounds; no bruits, organomegaly or masses.  Ext: No clubbing, cyanosis or edema   Neuro: Gait normal, DTR 2/4 Patellar bilaterally, CN II-XII intact  + DP pulses  Skin:  No lesions on exposed skin    orders written for new lab studies as appropriate; see orders.       Preventive Services:   Adult DTaP: 2018  Pneumovax: UTD   Flu Vaccine advised: Fall/Now/UTD: UTD   Zostervax/Shingrix: has not had it and wants to check with Dr. Can before getting it       Health Maintenance up to date:  Dexascan:  yes  2019  Colonoscopy:  yes  Had one at age 59 - three polyps removed, next colonoscopy at age 64  Mammogram:  no    Anticipatory Guidance   Safe Sex/STD's yes  Smoking Cessation yes  Sunscreen/ABCDs yes  Diet/Exercise yes  Recommend routing opth & dental care  yes      Diagnosis Plan   1. Well adult exam     2. Acquired hypothyroidism  TSH    TSH   3. Vitamin D deficiency  Vitamin D 25 hydroxy    Vitamin D 25 hydroxy   4. Elevated cholesterol  Lipid panel    Lipid panel   5. Screening for diabetes mellitus  Comprehensive metabolic panel    Comprehensive metabolic panel    FIT    FIT   6. Screening for colon cancer       Check routine labs  Check FIT test  Follow up with specialists  Stay active and follow a low fat diet  Recommend Vit D supplement but will wait for labs to see level

## 2021-01-01 LAB
25(OH)D3 SERPL-MCNC: 25 NG/ML (ref 30–100)
ALBUMIN SERPL-MCNC: 4.4 G/DL (ref 3.6–5.1)
ALBUMIN/GLOB SERPL: 2.4 (CALC) (ref 1–2.5)
ALP SERPL-CCNC: 49 U/L (ref 37–153)
ALT SERPL-CCNC: 11 U/L (ref 6–29)
AST SERPL-CCNC: 15 U/L (ref 10–35)
BASOPHILS # BLD AUTO: 61 CELLS/UL (ref 0–200)
BASOPHILS NFR BLD AUTO: 0.8 %
BILIRUB SERPL-MCNC: 0.5 MG/DL (ref 0.2–1.2)
BUN SERPL-MCNC: 13 MG/DL (ref 7–25)
BUN/CREAT SERPL: ABNORMAL (CALC) (ref 6–22)
CALCIUM SERPL-MCNC: 9.8 MG/DL (ref 8.6–10.4)
CHLORIDE SERPL-SCNC: 104 MMOL/L (ref 98–110)
CHOLEST SERPL-MCNC: 228 MG/DL
CHOLEST/HDLC SERPL: 4.5 (CALC)
CO2 SERPL-SCNC: 27 MMOL/L (ref 20–32)
CREAT SERPL-MCNC: 0.87 MG/DL (ref 0.5–0.99)
EOSINOPHIL # BLD AUTO: 84 CELLS/UL (ref 15–500)
EOSINOPHIL NFR BLD AUTO: 1.1 %
ERYTHROCYTE [DISTWIDTH] IN BLOOD BY AUTOMATED COUNT: 13.1 % (ref 11–15)
GLOBULIN SER CALC-MCNC: 1.8 G/DL (CALC) (ref 1.9–3.7)
GLUCOSE SERPL-MCNC: 95 MG/DL (ref 65–99)
HCT VFR BLD AUTO: 40.2 % (ref 35–45)
HDLC SERPL-MCNC: 51 MG/DL
HGB BLD-MCNC: 13.3 G/DL (ref 11.7–15.5)
LDLC SERPL CALC-MCNC: 157 MG/DL (CALC)
LYMPHOCYTES # BLD AUTO: 1056 CELLS/UL (ref 850–3900)
LYMPHOCYTES NFR BLD AUTO: 13.9 %
MCH RBC QN AUTO: 31.1 PG (ref 27–33)
MCHC RBC AUTO-ENTMCNC: 33.1 G/DL (ref 32–36)
MCV RBC AUTO: 94.1 FL (ref 80–100)
MONOCYTES # BLD AUTO: 562 CELLS/UL (ref 200–950)
MONOCYTES NFR BLD AUTO: 7.4 %
NEUTROPHILS # BLD AUTO: 5837 CELLS/UL (ref 1500–7800)
NEUTROPHILS NFR BLD AUTO: 76.8 %
NONHDLC SERPL-MCNC: 177 MG/DL (CALC)
PLATELET # BLD AUTO: 278 THOUSAND/UL (ref 140–400)
PMV BLD REES-ECKER: 8.7 FL (ref 7.5–12.5)
POTASSIUM SERPL-SCNC: 4.8 MMOL/L (ref 3.5–5.3)
PROT SERPL-MCNC: 6.2 G/DL (ref 6.1–8.1)
QUEST EGFR NON-AFR. AMERICAN: 72 ML/MIN/1.73M2
RBC # BLD AUTO: 4.27 MILLION/UL (ref 3.8–5.1)
SODIUM SERPL-SCNC: 140 MMOL/L (ref 135–146)
TRIGL SERPL-MCNC: 96 MG/DL
TSH SERPL-ACNC: 0.09 MIU/L (ref 0.4–4.5)
WBC # BLD AUTO: 7.6 THOUSAND/UL (ref 3.8–10.8)

## 2021-01-28 LAB — HEMOCCULT STL QL IA: NORMAL

## 2021-02-22 RX ORDER — LEVOTHYROXINE SODIUM 88 UG/1
TABLET ORAL
Qty: 90 TABLET | Refills: 0 | Status: SHIPPED | OUTPATIENT
Start: 2021-02-22 | End: 2021-04-14 | Stop reason: SDUPTHER

## 2021-04-14 DIAGNOSIS — Z23 ENCOUNTER FOR IMMUNIZATION: ICD-10-CM

## 2021-09-24 ENCOUNTER — TELEPHONE (OUTPATIENT)
Dept: FAMILY MEDICINE | Facility: CLINIC | Age: 61
End: 2021-09-24

## 2021-09-24 NOTE — TELEPHONE ENCOUNTER
Pt does not want to wait until December for physical with Dr. Mcwilliams.    She has new insurance    Last EPP 12.31.20

## 2021-09-29 ENCOUNTER — TRANSCRIBE ORDERS (OUTPATIENT)
Dept: SCHEDULING | Age: 61
End: 2021-09-29
Payer: COMMERCIAL

## 2021-09-29 DIAGNOSIS — Z78.0 ASYMPTOMATIC MENOPAUSAL STATE: Primary | ICD-10-CM

## 2021-09-29 DIAGNOSIS — M81.8 OTHER OSTEOPOROSIS WITHOUT CURRENT PATHOLOGICAL FRACTURE: ICD-10-CM

## 2021-10-12 ENCOUNTER — OFFICE VISIT (OUTPATIENT)
Dept: FAMILY MEDICINE | Facility: CLINIC | Age: 61
End: 2021-10-12
Payer: COMMERCIAL

## 2021-10-12 VITALS
TEMPERATURE: 97.7 F | RESPIRATION RATE: 13 BRPM | HEIGHT: 68 IN | BODY MASS INDEX: 18.04 KG/M2 | DIASTOLIC BLOOD PRESSURE: 90 MMHG | OXYGEN SATURATION: 99 % | SYSTOLIC BLOOD PRESSURE: 168 MMHG | HEART RATE: 65 BPM | WEIGHT: 119 LBS

## 2021-10-12 DIAGNOSIS — C50.919 MALIGNANT NEOPLASM OF FEMALE BREAST, UNSPECIFIED ESTROGEN RECEPTOR STATUS, UNSPECIFIED LATERALITY, UNSPECIFIED SITE OF BREAST (CMS/HCC): ICD-10-CM

## 2021-10-12 DIAGNOSIS — D46.9 MDS (MYELODYSPLASTIC SYNDROME) (CMS/HCC): ICD-10-CM

## 2021-10-12 DIAGNOSIS — Z00.00 ENCOUNTER FOR GENERAL ADULT MEDICAL EXAMINATION WITHOUT ABNORMAL FINDINGS: Primary | ICD-10-CM

## 2021-10-12 DIAGNOSIS — Z23 FLU VACCINE NEED: ICD-10-CM

## 2021-10-12 DIAGNOSIS — E03.9 ACQUIRED HYPOTHYROIDISM: ICD-10-CM

## 2021-10-12 PROCEDURE — 99396 PREV VISIT EST AGE 40-64: CPT | Mod: 25 | Performed by: FAMILY MEDICINE

## 2021-10-12 PROCEDURE — 90686 IIV4 VACC NO PRSV 0.5 ML IM: CPT | Performed by: FAMILY MEDICINE

## 2021-10-12 PROCEDURE — 3008F BODY MASS INDEX DOCD: CPT | Performed by: FAMILY MEDICINE

## 2021-10-12 PROCEDURE — 90471 IMMUNIZATION ADMIN: CPT | Performed by: FAMILY MEDICINE

## 2021-10-12 RX ORDER — LEVOTHYROXINE SODIUM 75 UG/1
75 TABLET ORAL
Qty: 90 TABLET | Refills: 3 | Status: SHIPPED | OUTPATIENT
Start: 2021-10-12 | End: 2022-10-05

## 2021-10-12 RX ORDER — VIT C/E/ZN/COPPR/LUTEIN/ZEAXAN 250MG-90MG
1000 CAPSULE ORAL DAILY
COMMUNITY

## 2021-10-12 ASSESSMENT — ENCOUNTER SYMPTOMS
BACK PAIN: 0
NERVOUS/ANXIOUS: 0
EYE PAIN: 0
DIZZINESS: 0
HEADACHES: 0
PALPITATIONS: 0
FREQUENCY: 0
SLEEP DISTURBANCE: 0
CHILLS: 0
DIARRHEA: 0
NAUSEA: 0
DYSURIA: 0
SORE THROAT: 0
ADENOPATHY: 0
SHORTNESS OF BREATH: 0
RHINORRHEA: 0
WHEEZING: 0
COUGH: 0
LIGHT-HEADEDNESS: 0
FEVER: 0
VOMITING: 0
CONSTIPATION: 0

## 2021-10-12 NOTE — PROGRESS NOTES
"      Subjective      Patient ID: Toño Knight is a 61 y.o. female.  1960      Pt presents for a physical exam.  She is going to see a cardiologist to follow-up on her transplant surgery. Her home BP readings are less than 140 systolic. Pt walks at a good pace for 40 minutes, 3 or 4 days out of the week and has lost 3 pounds.      The following have been reviewed and updated as appropriate in this visit:  Allergies  Meds  Problems       Review of Systems   Constitutional: Negative for chills and fever.   HENT: Negative for congestion, ear pain, rhinorrhea and sore throat.    Eyes: Negative for pain.   Respiratory: Negative for cough, shortness of breath and wheezing.    Cardiovascular: Negative for chest pain and palpitations.   Gastrointestinal: Negative for constipation, diarrhea, nausea and vomiting.   Endocrine: Negative for heat intolerance.   Genitourinary: Negative for dysuria and frequency.   Musculoskeletal: Negative for back pain.   Skin: Negative for rash.   Neurological: Negative for dizziness, light-headedness and headaches.   Hematological: Negative for adenopathy.   Psychiatric/Behavioral: Negative for behavioral problems and sleep disturbance. The patient is not nervous/anxious.        Objective     Vitals:    10/12/21 1328   BP: (!) 168/90   BP Location: Left upper arm   Patient Position: Sitting   Pulse: 65   Resp: 13   Temp: 36.5 °C (97.7 °F)   TempSrc: Temporal   SpO2: 99%   Weight: 54 kg (119 lb)   Height: 1.722 m (5' 7.8\")     Body mass index is 18.2 kg/m².    Physical Exam  Vitals reviewed.   Constitutional:       General: She is not in acute distress.     Appearance: She is well-developed. She is not diaphoretic.   HENT:      Head: Normocephalic and atraumatic.      Right Ear: External ear normal.      Left Ear: External ear normal.      Nose: Nose normal.   Eyes:      General: No scleral icterus.        Right eye: No discharge.         Left eye: No discharge.      Pupils: Pupils " are equal, round, and reactive to light.   Cardiovascular:      Rate and Rhythm: Normal rate and regular rhythm.      Heart sounds: Normal heart sounds. No murmur heard.  No friction rub. No gallop.    Pulmonary:      Effort: Pulmonary effort is normal. No respiratory distress.      Breath sounds: Normal breath sounds. No stridor. No wheezing or rales.   Chest:      Chest wall: No tenderness.   Musculoskeletal:         General: No deformity.      Cervical back: Normal range of motion and neck supple.   Skin:     General: Skin is warm and dry.      Coloration: Skin is not pale.      Findings: No erythema or rash.   Neurological:      Mental Status: She is alert.      Cranial Nerves: No cranial nerve deficit.      Coordination: Coordination normal.   Psychiatric:         Behavior: Behavior normal.         Thought Content: Thought content normal.         Judgment: Judgment normal.         Assessment/Plan   Diagnoses and all orders for this visit:    Encounter for general adult medical examination without abnormal findings (Primary)    Malignant neoplasm of female breast, unspecified estrogen receptor status, unspecified laterality, unspecified site of breast (CMS/HCC)    MDS (myelodysplastic syndrome) (CMS/HCC)    Flu vaccine need  -     Influenza vaccine quadrivalent preservative free 6 mon and older IM    Acquired hypothyroidism    Other orders  -     levothyroxine 75 mcg tablet; Take 1 tablet (75 mcg total) by mouth once daily.        Pt will get vaccine boosters for MMR and hepatitis B through [facility]. Flu vaccine administered today in the office, and Shingrix will be administered after this month. Colonoscopies UTD - five-year follow-up recommended. Lab work reviewed and satisfactory. Refilled levothyroxine. Pt's BP is lower than 140 systolic at home. Continue to monitor BP.        By signing my name below, I, Edy Velasco, attest that this documentation has been prepared under the direction and in the presence of  Abdulkadir Mcwilliams DO Electronically signed: Jamel Bazan. 10/12/2021 2:25 PM     I, Abdulkadir Mcwilliams DO, personally performed the services described in this documentation. All medical record entries made by the armandoibe were at my direction and in my presence. I have reviewed the chart and agree that the record reflects my personal performance and is accurate and complete. Abdulkadir Mcwilliams DO. 10/12/2021. 2:25 PM.

## 2021-11-16 LAB
25(OH)D3 SERPL-MCNC: 37 NG/ML (ref 30–100)
ALBUMIN SERPL-MCNC: 4.2 G/DL (ref 3.6–5.1)
ALBUMIN/GLOB SERPL: 2.1 (CALC) (ref 1–2.5)
ALP SERPL-CCNC: 47 U/L (ref 37–153)
ALT SERPL-CCNC: 11 U/L (ref 6–29)
AST SERPL-CCNC: 15 U/L (ref 10–35)
BILIRUB SERPL-MCNC: 0.6 MG/DL (ref 0.2–1.2)
BUN SERPL-MCNC: 15 MG/DL (ref 7–25)
BUN/CREAT SERPL: NORMAL (CALC) (ref 6–22)
CALCIUM SERPL-MCNC: 9.3 MG/DL (ref 8.6–10.4)
CHLORIDE SERPL-SCNC: 102 MMOL/L (ref 98–110)
CHOLEST SERPL-MCNC: 214 MG/DL
CHOLEST/HDLC SERPL: 4.4 (CALC)
CO2 SERPL-SCNC: 28 MMOL/L (ref 20–32)
CREAT SERPL-MCNC: 0.87 MG/DL (ref 0.5–0.99)
GLOBULIN SER CALC-MCNC: 2 G/DL (CALC) (ref 1.9–3.7)
GLUCOSE SERPL-MCNC: 88 MG/DL (ref 65–99)
HDLC SERPL-MCNC: 49 MG/DL
LDLC SERPL CALC-MCNC: 143 MG/DL (CALC)
NONHDLC SERPL-MCNC: 165 MG/DL (CALC)
POTASSIUM SERPL-SCNC: 4.3 MMOL/L (ref 3.5–5.3)
PROT SERPL-MCNC: 6.2 G/DL (ref 6.1–8.1)
QUEST EGFR AFRICAN AMERICAN: 83 ML/MIN/1.73M2
QUEST EGFR NON-AFR. AMERICAN: 71 ML/MIN/1.73M2
SODIUM SERPL-SCNC: 140 MMOL/L (ref 135–146)
TRIGL SERPL-MCNC: 107 MG/DL
TSH SERPL-ACNC: 0.51 MIU/L (ref 0.4–4.5)

## 2021-11-19 NOTE — RESULT ENCOUNTER NOTE
Mariluz,Your labs show improvement in cholesterol from last check (228-214 total).  Continue to work on lower cholesterol diet to bring down your bad cholesterol (LDL) which is still a bit high at 143.  Your kidney and liver function look good, your sugar is normal, your thyroid and vitamin D are both in normal range.    Let me know of any questions.Dr. Mcwilliams

## 2021-11-24 ENCOUNTER — HOSPITAL ENCOUNTER (OUTPATIENT)
Dept: RADIOLOGY | Facility: HOSPITAL | Age: 61
Discharge: HOME | End: 2021-11-24
Attending: PHYSICIAN ASSISTANT
Payer: COMMERCIAL

## 2021-11-24 DIAGNOSIS — Z78.0 ASYMPTOMATIC MENOPAUSAL STATE: ICD-10-CM

## 2021-11-24 DIAGNOSIS — M81.8 OTHER OSTEOPOROSIS WITHOUT CURRENT PATHOLOGICAL FRACTURE: ICD-10-CM

## 2021-11-24 PROCEDURE — 77080 DXA BONE DENSITY AXIAL: CPT

## 2021-12-10 ENCOUNTER — TELEPHONE (OUTPATIENT)
Dept: FAMILY MEDICINE | Facility: CLINIC | Age: 61
End: 2021-12-10
Payer: COMMERCIAL

## 2021-12-10 NOTE — TELEPHONE ENCOUNTER
Received call from patient stating she believes she took two dose of her levothyroxine 75mcg today. Advised patient skip tomorrow mornings dose and resume on Sunday. Patient acknowledged understanding.

## 2021-12-29 ENCOUNTER — CONSULT (OUTPATIENT)
Dept: FAMILY MEDICINE | Facility: CLINIC | Age: 61
End: 2021-12-29
Payer: COMMERCIAL

## 2021-12-29 VITALS
OXYGEN SATURATION: 99 % | WEIGHT: 123.4 LBS | SYSTOLIC BLOOD PRESSURE: 140 MMHG | HEART RATE: 66 BPM | TEMPERATURE: 97 F | RESPIRATION RATE: 16 BRPM | HEIGHT: 68 IN | BODY MASS INDEX: 18.7 KG/M2 | DIASTOLIC BLOOD PRESSURE: 80 MMHG

## 2021-12-29 DIAGNOSIS — Z01.818 PRE-OP EVALUATION: Primary | ICD-10-CM

## 2021-12-29 DIAGNOSIS — H26.9 CATARACT OF BOTH EYES, UNSPECIFIED CATARACT TYPE: ICD-10-CM

## 2021-12-29 PROCEDURE — 3008F BODY MASS INDEX DOCD: CPT | Performed by: FAMILY MEDICINE

## 2021-12-29 PROCEDURE — 99214 OFFICE O/P EST MOD 30 MIN: CPT | Performed by: FAMILY MEDICINE

## 2021-12-29 ASSESSMENT — ENCOUNTER SYMPTOMS
RHINORRHEA: 0
DIARRHEA: 0
NAUSEA: 0
VOMITING: 0
ADENOPATHY: 0
SHORTNESS OF BREATH: 0
EYE PAIN: 0
CONSTIPATION: 0
NERVOUS/ANXIOUS: 0
COUGH: 0
CHILLS: 0
FEVER: 0
SLEEP DISTURBANCE: 0
FREQUENCY: 0
DIZZINESS: 0
PALPITATIONS: 0
HEADACHES: 0
ARTHRALGIAS: 0
DYSURIA: 0
LIGHT-HEADEDNESS: 0
BACK PAIN: 0
SORE THROAT: 0
WHEEZING: 0

## 2021-12-29 NOTE — PROGRESS NOTES
Subjective      Patient ID: Toño Knight is a 61 y.o. female.  1960      HPI    Pt is a 61 year old female who presents to the office for pre-op clearance. Pt is having cataract surgery on 01/06/2022 with Dr. Jacobs. Pt's BP is elevated today at 170/80 but she notes her BP is usually at the 114-126 systolic range at home.    Past Medical History:   Diagnosis Date   • Melanoma (CMS/HCC) 11/01/2020       No past surgical history on file.    Social History     Socioeconomic History   • Marital status: Single     Spouse name: Not on file   • Number of children: Not on file   • Years of education: Not on file   • Highest education level: Not on file   Occupational History   • Not on file   Tobacco Use   • Smoking status: Never Smoker   • Smokeless tobacco: Never Used   Substance and Sexual Activity   • Alcohol use: No   • Drug use: Not on file   • Sexual activity: Not on file   Other Topics Concern   • Not on file   Social History Narrative   • Not on file     Social Determinants of Health     Financial Resource Strain: Not on file   Food Insecurity: Not on file   Transportation Needs: Not on file   Physical Activity: Not on file   Stress: Not on file   Social Connections: Not on file   Intimate Partner Violence: Not on file   Housing Stability: Not on file       No family history on file.      Current Outpatient Medications:   •  alendronate (FOSAMAX) 70 mg tablet, TAKE 1 TABLET ONCE A WEEK, Disp: , Rfl: 3  •  cholecalciferol, vitamin D3, 25 mcg (1,000 unit) capsule, Take 1,000 Units by mouth daily., Disp: , Rfl:   •  levothyroxine 75 mcg tablet, Take 1 tablet (75 mcg total) by mouth once daily., Disp: 90 tablet, Rfl: 3    Allergies   Allergen Reactions   • No Known Drug Allergies      The following have been reviewed and updated as appropriate in this visit:   Allergies  Meds  Problems       Review of Systems   Constitutional: Negative for chills and fever.   HENT: Negative for congestion, ear pain,  "rhinorrhea and sore throat.    Eyes: Negative for pain.   Respiratory: Negative for cough, shortness of breath and wheezing.    Cardiovascular: Negative for chest pain and palpitations.   Gastrointestinal: Negative for constipation, diarrhea, nausea and vomiting.   Genitourinary: Negative for dysuria and frequency.   Musculoskeletal: Negative for arthralgias and back pain.   Skin: Negative for rash.   Neurological: Negative for dizziness, light-headedness and headaches.   Hematological: Negative for adenopathy.   Psychiatric/Behavioral: Negative for behavioral problems and sleep disturbance. The patient is not nervous/anxious.        Objective     Vitals:    12/29/21 1455   BP: 140/80   BP Location: Left upper arm   Patient Position: Sitting   Pulse: 66   Resp: 16   Temp: 36.1 °C (97 °F)   TempSrc: Temporal   SpO2: 99%   Weight: 56 kg (123 lb 6.4 oz)   Height: 1.722 m (5' 7.8\")     Body mass index is 18.87 kg/m².    Physical Exam  Vitals reviewed.   Constitutional:       General: She is not in acute distress.     Appearance: She is well-developed. She is not diaphoretic.   HENT:      Head: Normocephalic and atraumatic.      Right Ear: External ear normal.      Left Ear: External ear normal.      Nose: Nose normal.   Eyes:      General: No scleral icterus.        Right eye: No discharge.         Left eye: No discharge.      Conjunctiva/sclera: Conjunctivae normal.      Pupils: Pupils are equal, round, and reactive to light.   Cardiovascular:      Rate and Rhythm: Normal rate and regular rhythm.      Heart sounds: Normal heart sounds. No murmur heard.    No friction rub. No gallop.   Pulmonary:      Effort: Pulmonary effort is normal. No respiratory distress.      Breath sounds: Normal breath sounds. No stridor. No wheezing or rales.   Chest:      Chest wall: No tenderness.   Musculoskeletal:         General: No deformity.      Cervical back: Normal range of motion and neck supple.   Skin:     General: Skin is warm " and dry.      Coloration: Skin is not pale.      Findings: No erythema or rash.   Neurological:      Mental Status: She is alert and oriented to person, place, and time.      Cranial Nerves: No cranial nerve deficit.      Coordination: Coordination normal.   Psychiatric:         Behavior: Behavior normal.         Thought Content: Thought content normal.         Judgment: Judgment normal.         Assessment/Plan   Diagnoses and all orders for this visit:    Pre-op evaluation (Primary)    Cataract of both eyes, unspecified cataract type    Acceptable risk for procedure. Pt's BP is stable at 114-126/80 according to home readings.    By signing my name below, I, Mandeep Dc, attest that this documentation has been prepared under the direction and in the presence of Abdulkadir Mcwilliams DO. Electronically signed: Jamel Oviedo. 12/29/2021 6:29 PM

## 2022-06-29 ENCOUNTER — HOSPITAL ENCOUNTER (OUTPATIENT)
Facility: CLINIC | Age: 62
Discharge: HOME | End: 2022-06-29
Attending: FAMILY MEDICINE
Payer: COMMERCIAL

## 2022-06-29 VITALS
DIASTOLIC BLOOD PRESSURE: 80 MMHG | SYSTOLIC BLOOD PRESSURE: 130 MMHG | OXYGEN SATURATION: 98 % | HEART RATE: 69 BPM | TEMPERATURE: 97.5 F

## 2022-06-29 DIAGNOSIS — N39.0 URINARY TRACT INFECTION WITHOUT HEMATURIA, SITE UNSPECIFIED: Primary | ICD-10-CM

## 2022-06-29 LAB
BILIRUBIN, POC: NEGATIVE
BLOOD URINE, POC: NEGATIVE
CLARITY, POC: CLEAR
COLOR, POC: YELLOW
EXPIRATION DATE: NORMAL
GLUCOSE URINE, POC: NEGATIVE
KETONES, POC: NEGATIVE
LEUKOCYTE EST, POC: NEGATIVE
Lab: NORMAL
NITRITE, POC: NEGATIVE
PH, POC: 6
POCT MANUFACTURER: NORMAL
PROTEIN, POC: NEGATIVE
SPECIFIC GRAVITY, POC: 1.01

## 2022-06-29 PROCEDURE — S9088 SERVICES PROVIDED IN URGENT: HCPCS | Performed by: NURSE PRACTITIONER

## 2022-06-29 PROCEDURE — 99213 OFFICE O/P EST LOW 20 MIN: CPT | Performed by: NURSE PRACTITIONER

## 2022-06-29 PROCEDURE — 81002 URINALYSIS NONAUTO W/O SCOPE: CPT | Performed by: NURSE PRACTITIONER

## 2022-06-29 RX ORDER — NITROFURANTOIN 25; 75 MG/1; MG/1
100 CAPSULE ORAL 2 TIMES DAILY
Qty: 10 CAPSULE | Refills: 0 | Status: SHIPPED | OUTPATIENT
Start: 2022-06-29 | End: 2022-07-04

## 2022-06-29 ASSESSMENT — ENCOUNTER SYMPTOMS
COLOR CHANGE: 0
SORE THROAT: 0
DYSURIA: 1
HEMATURIA: 0
BACK PAIN: 1
COUGH: 0
CHILLS: 0
ABDOMINAL PAIN: 0
ARTHRALGIAS: 0
SHORTNESS OF BREATH: 0
EYE PAIN: 0
VOMITING: 0
PALPITATIONS: 0
SEIZURES: 0
FEVER: 0

## 2022-06-29 NOTE — ED PROVIDER NOTES
Emergency Medicine Note  HPI   HISTORY OF PRESENT ILLNESS     Patient presents lower abdominal discomfort, lower back pain and cloudy urine that started yesterday.  Denies fevers or hematuria.   No history of frequent UTIs.             Patient History   PAST HISTORY     Reviewed from Nursing Triage:         Past Medical History:   Diagnosis Date   • Melanoma (CMS/HCC) 11/01/2020       No past surgical history on file.    No family history on file.    Social History     Tobacco Use   • Smoking status: Never Smoker   • Smokeless tobacco: Never Used   Substance Use Topics   • Alcohol use: No         Review of Systems   REVIEW OF SYSTEMS     Review of Systems   Constitutional: Negative for chills and fever.   HENT: Negative for ear pain and sore throat.    Eyes: Negative for pain and visual disturbance.   Respiratory: Negative for cough and shortness of breath.    Cardiovascular: Negative for chest pain and palpitations.   Gastrointestinal: Negative for abdominal pain and vomiting.   Genitourinary: Positive for dysuria, pelvic pain and vaginal discharge. Negative for hematuria.   Musculoskeletal: Positive for back pain. Negative for arthralgias.   Skin: Negative for color change and rash.   Neurological: Negative for seizures and syncope.   All other systems reviewed and are negative.        VITALS     ED Vitals    Date/Time Temp Pulse Resp BP SpO2 [facility]   06/29/22 1902 36.4 °C (97.5 °F) 69 -- 130/80 98 % TMJ                       Physical Exam   PHYSICAL EXAM     Physical Exam  Vitals reviewed.   Constitutional:       General: She is not in acute distress.     Appearance: Normal appearance. She is not ill-appearing, toxic-appearing or diaphoretic.   HENT:      Head: Normocephalic and atraumatic.   Cardiovascular:      Rate and Rhythm: Normal rate.   Pulmonary:      Effort: Pulmonary effort is normal. No respiratory distress.   Abdominal:      Tenderness: There is no right CVA tenderness or left CVA tenderness.    Musculoskeletal:         General: Normal range of motion.      Cervical back: Normal range of motion. No rigidity.   Skin:     General: Skin is warm and dry.   Neurological:      General: No focal deficit present.      Mental Status: She is alert and oriented to person, place, and time.   Psychiatric:         Mood and Affect: Mood normal.         Behavior: Behavior normal.           PROCEDURES     Procedures     DATA     Results     None              No orders to display       Scoring tools                                 ED Course & MDM   MDM / ED COURSE / CLINICAL IMPRESSIONS / DISPO     MDM  Number of Diagnoses or Management Options  Urinary tract infection without hematuria, site unspecified  Diagnosis management comments: Urine dip was negative but will treat based on symptoms.   Sent urine culture, will call with results.   Sent prescription for Macrobid to requested pharmacy.   If no improvement follow up with GYN.  Instructed to follow up with PCP or ED for new or worsening symptoms.         Clinical Impressions as of 06/29/22 1930   Urinary tract infection without hematuria, site unspecified              Sanaz Pineda CRNP  06/29/22 1935

## 2022-06-29 NOTE — DISCHARGE INSTRUCTIONS
You are being treated for urinary tract infection (UTI) with an antibiotic.  Symptomatic relief with prescription Pyridium tablets and/or OTC Azo were discussed.  I recommend drinking plenty of fluids to help flush out the infection.  If you develop new onset low back pain, abdominal pain, fevers or chills over the next several days, please call your primary care provider to discuss seeking reevaluation or seek immediate medical attention.    If the urine sample was sent for additional testing (culture and sensitivity to antibiotics), you will receive a call only if your antibiotic needs to be changed to a different one.  If you are signed up for My Chart, results may also be viewed in that patient portal.

## 2022-06-30 NOTE — ED ATTESTATION NOTE
Reviewed  and supervised the care ,I agreed with the assessment and plan      Bronwyn Rao MD  06/29/22 5301

## 2022-07-02 LAB — BACTERIA UR CULT: NORMAL

## 2022-10-05 RX ORDER — LEVOTHYROXINE SODIUM 75 UG/1
TABLET ORAL
Qty: 90 TABLET | Refills: 3 | Status: SHIPPED | OUTPATIENT
Start: 2022-10-05 | End: 2023-11-03

## 2023-02-15 ENCOUNTER — TELEPHONE (OUTPATIENT)
Dept: FAMILY MEDICINE | Facility: CLINIC | Age: 63
End: 2023-02-15

## 2023-02-15 ENCOUNTER — TELEMEDICINE (OUTPATIENT)
Dept: FAMILY MEDICINE | Facility: CLINIC | Age: 63
End: 2023-02-15
Payer: COMMERCIAL

## 2023-02-15 DIAGNOSIS — U07.1 COVID-19 VIRUS INFECTION: Primary | ICD-10-CM

## 2023-02-15 DIAGNOSIS — C50.919 MALIGNANT NEOPLASM OF FEMALE BREAST, UNSPECIFIED ESTROGEN RECEPTOR STATUS, UNSPECIFIED LATERALITY, UNSPECIFIED SITE OF BREAST (CMS/HCC): ICD-10-CM

## 2023-02-15 DIAGNOSIS — D46.9 MDS (MYELODYSPLASTIC SYNDROME) (CMS/HCC): ICD-10-CM

## 2023-02-15 PROCEDURE — 99213 OFFICE O/P EST LOW 20 MIN: CPT | Mod: 95 | Performed by: FAMILY MEDICINE

## 2023-02-15 ASSESSMENT — ENCOUNTER SYMPTOMS
EYE PAIN: 0
NAUSEA: 0
HEMATURIA: 0
CHILLS: 0
SHORTNESS OF BREATH: 0
EYE REDNESS: 0
HEADACHES: 1
CHEST TIGHTNESS: 0
MYALGIAS: 1
DIARRHEA: 0
BLOOD IN STOOL: 0
FEVER: 0
SINUS PRESSURE: 0
DYSURIA: 0
CONSTIPATION: 0
SORE THROAT: 0

## 2023-02-15 NOTE — TELEPHONE ENCOUNTER
Pt tested positive for covid 2/15, symptoms started Monday.   Fatigue, sore throat, feeling cold; no fever.  Pt would like medication to treat.    Please call to advise.

## 2023-02-15 NOTE — PROGRESS NOTES
Verification of Patient Location:  The patient affirms they are currently located in the following state: [place]    Request for Consent:    Audio and Video Encounter   Hello, my name is Abdulkadir McwilliamsDO.  Before we proceed, can you please verify your identification by telling me your full name and date of birth?  Can you tell me who is in the room with you?    You and I are about to have a telemedicine check-in or visit because you have requested it.  This is a live video-conference.  I am a real person, speaking to you in real time.  There is no one else with me on the video-conference. I am not recording this conversation and I am asking you not to record it.  This telemedicine visit will be billed to your health insurance or you, if you are self-insured.  You understand you will be responsible for any copayments or coinsurances that apply to your telemedicine visit.  Communication platform used for this encounter:  Amanda [employer] Video Visit (Epic Video Client)       Before starting our telemedicine visit, I am required to get your consent for this virtual check-in or visit by telemedicine. Do you consent?      Patient Response to Request for Consent:  Yes      Visit Documentation:  Subjective     Patient ID: Toño Knight is a 62 y.o. female.  1960      Toño Knight is a 62 year old female who presents for COVID-19. Symptoms began on Monday with a headache, then progressively worsened to today. Worst symptom is currently body aches. She denies any fevers. She tested positive on a home test. This is her first time being infected with COVID.       The following have been reviewed and updated as appropriate in this visit:   Allergies  Meds  Problems       Review of Systems   Constitutional: Negative for chills and fever.   HENT: Negative for congestion, ear pain, postnasal drip, sinus pressure and sore throat.    Eyes: Negative for pain and redness.   Respiratory: Negative for chest tightness and  shortness of breath.    Cardiovascular: Negative for chest pain.   Gastrointestinal: Negative for blood in stool, constipation, diarrhea and nausea.   Genitourinary: Negative for dysuria and hematuria.   Musculoskeletal: Positive for myalgias.        Body aches   Skin: Negative for rash.   Neurological: Positive for headaches.         Assessment/Plan   Diagnoses and all orders for this visit:    COVID-19 virus infection (Primary)    Malignant neoplasm of female breast, unspecified estrogen receptor status, unspecified laterality, unspecified site of breast (CMS/HCC)    MDS (myelodysplastic syndrome) (CMS/HCC)    Other orders  -     nirmatrelvir-ritonavir (PAXLOVID) tablets,dose pack dose package; Take 3 tablets by mouth 2 (two) times a day for 5 days. Each dose is 2 tablets of nirmatrelvir and one tablet of ritonavir      Paxlovid prescribed.    Labs reviewed - Creatinine fine.    No med interactions.      Time Spent:  I spent 10 minutes on this date of service performing the following activities: obtaining history, performing examination, entering orders, documenting, preparing for visit, obtaining / reviewing records, providing counseling and education and coordinating care.    By signing my name below, I, Gopal Brandon, attest that this documentation has been prepared under the direction and in the presence of Abdulkadir Mcwilliams DO      2/15/2023 7:15 PM

## 2023-03-14 ENCOUNTER — TELEMEDICINE (OUTPATIENT)
Dept: FAMILY MEDICINE | Facility: CLINIC | Age: 63
End: 2023-03-14
Payer: COMMERCIAL

## 2023-03-14 DIAGNOSIS — J02.9 PHARYNGITIS, UNSPECIFIED ETIOLOGY: Primary | ICD-10-CM

## 2023-03-14 PROCEDURE — 99213 OFFICE O/P EST LOW 20 MIN: CPT | Mod: 95 | Performed by: FAMILY MEDICINE

## 2023-03-14 RX ORDER — AMOXICILLIN 875 MG/1
875 TABLET, FILM COATED ORAL 2 TIMES DAILY
Qty: 20 TABLET | Refills: 0 | Status: SHIPPED | OUTPATIENT
Start: 2023-03-14 | End: 2023-03-24

## 2023-03-14 ASSESSMENT — ENCOUNTER SYMPTOMS
FREQUENCY: 0
SHORTNESS OF BREATH: 0
FEVER: 0
DIARRHEA: 0
DYSURIA: 0
BACK PAIN: 0
DIZZINESS: 0
VOMITING: 0
NERVOUS/ANXIOUS: 0
LIGHT-HEADEDNESS: 0
PALPITATIONS: 0
COUGH: 0
WHEEZING: 0
NAUSEA: 0
HEADACHES: 0
CHILLS: 0
SORE THROAT: 0
ADENOPATHY: 0
RHINORRHEA: 0
EYE PAIN: 0
CONSTIPATION: 0
SLEEP DISTURBANCE: 0

## 2023-03-14 NOTE — PROGRESS NOTES
Verification of Patient Location:  The patient affirms they are currently located in the following state: [place]    Request for Consent:    Audio and Video Encounter   Hello, my name is Abdulkadir DO Oj.  Before we proceed, can you please verify your identification by telling me your full name and date of birth?  Can you tell me who is in the room with you?    You and I are about to have a telemedicine check-in or visit because you have requested it.  This is a live video-conference.  I am a real person, speaking to you in real time.  There is no one else with me on the video-conference. I am not recording this conversation and I am asking you not to record it.  This telemedicine visit will be billed to your health insurance or you, if you are self-insured.  You understand you will be responsible for any copayments or coinsurances that apply to your telemedicine visit.  Communication platform used for this encounter:  [employer] Video Visit (Epic Video Client)       Before starting our telemedicine visit, I am required to get your consent for this virtual check-in or visit by telemedicine. Do you consent?      Patient Response to Request for Consent:  Yes      Visit Documentation:  Subjective     Patient ID: Toño Knight is a 63 y.o. female.  1960      Toño Knight is a 63 year old female who presents for possible strep throat. Pt was around her niece on Thursday. Her niece then test positive for strep throat. On , Toño began to develop similar symptoms. Yesterday her temp was 99.5F. Today, sore throat is still present, although pain is dull. Headache has persisted. She denies cough or congestion. Pt has been taking Theraflu.    Toño's mother  last week. She would like to be healthy for her .       The following have been reviewed and updated as appropriate in this visit:   Allergies  Meds  Problems       Review of Systems   Constitutional: Negative for chills and fever.   HENT:  Negative for congestion, ear pain, rhinorrhea and sore throat.    Eyes: Negative for pain.   Respiratory: Negative for cough, shortness of breath and wheezing.    Cardiovascular: Negative for chest pain and palpitations.   Gastrointestinal: Negative for constipation, diarrhea, nausea and vomiting.   Endocrine: Negative for heat intolerance.   Genitourinary: Negative for dysuria and frequency.   Musculoskeletal: Negative for back pain.   Skin: Negative for rash.   Neurological: Negative for dizziness, light-headedness and headaches.   Hematological: Negative for adenopathy.   Psychiatric/Behavioral: Negative for behavioral problems and sleep disturbance. The patient is not nervous/anxious.          Assessment/Plan   Diagnoses and all orders for this visit:    Pharyngitis, unspecified etiology (Primary)    Other orders  -     amoxicillin (AMOXIL) 875 mg tablet; Take 1 tablet (875 mg total) by mouth 2 (two) times a day for 10 days.        Prescribed Amoxicillin - 1 tab 2x daily for 2 days.     Continue supportive care. Hydrate well.   Can continue Theraflu as needed.    Time Spent:  I spent 5 minutes on this date of service performing the following activities: obtaining history, performing examination, entering orders, documenting, preparing for visit, providing counseling and education and coordinating care.    By signing my name below, I, Gopal Brandon, attest that this documentation has been prepared under the direction and in the presence of Abdulkadir Mcwilliams DO    I, Abdulkadir Mcwilliams DO, personally performed the services described in this documentation. All medical record entries made by the scribe were at my direction and in my presence. I have reviewed the chart and agree that the record reflects my personal performance and is accurate and complete. Abdulkadir Mcwilliams DO. 3/14/2023. 10:29 AM.        3/14/2023 10:29 AM

## 2023-03-15 ENCOUNTER — TELEPHONE (OUTPATIENT)
Dept: FAMILY MEDICINE | Facility: CLINIC | Age: 63
End: 2023-03-15
Payer: COMMERCIAL

## 2023-03-15 NOTE — TELEPHONE ENCOUNTER
Toño had telemedicine apt with Dr. Mcwilliams yesterday 3/15/23.   She woke up today with UTI?  Does she need to come in for apt or can prescription be sent?

## 2023-03-15 NOTE — TELEPHONE ENCOUNTER
[place] with patient informing her that the amox should work for her UTI as well and to call us if her symptoms persist after she finishes her amox.

## 2023-03-27 ENCOUNTER — OFFICE VISIT (OUTPATIENT)
Dept: FAMILY MEDICINE | Facility: CLINIC | Age: 63
End: 2023-03-27
Payer: COMMERCIAL

## 2023-03-27 VITALS
RESPIRATION RATE: 16 BRPM | WEIGHT: 122.4 LBS | TEMPERATURE: 97.3 F | HEIGHT: 68 IN | BODY MASS INDEX: 18.55 KG/M2 | SYSTOLIC BLOOD PRESSURE: 140 MMHG | DIASTOLIC BLOOD PRESSURE: 98 MMHG | HEART RATE: 72 BPM | OXYGEN SATURATION: 99 %

## 2023-03-27 DIAGNOSIS — R39.9 UTI SYMPTOMS: Primary | ICD-10-CM

## 2023-03-27 LAB
BACTERIA, POC: NORMAL
BILIRUBIN, POC: NEGATIVE
BLOOD URINE, POC: NORMAL
CLARITY, POC: NORMAL
COLOR, POC: YELLOW
EXPIRATION DATE: NORMAL
GLUCOSE URINE, POC: NEGATIVE
KETONES, POC: NEGATIVE
LEUKOCYTE EST, POC: NORMAL
Lab: NORMAL
NITRITE, POC: NEGATIVE
PH, POC: 6
POCT MANUFACTURER: NORMAL
PROTEIN, POC: NORMAL
SPECIFIC GRAVITY, POC: 1.01
UROBILINOGEN, POC: 4

## 2023-03-27 PROCEDURE — 3008F BODY MASS INDEX DOCD: CPT | Performed by: FAMILY MEDICINE

## 2023-03-27 PROCEDURE — 81002 URINALYSIS NONAUTO W/O SCOPE: CPT | Performed by: FAMILY MEDICINE

## 2023-03-27 PROCEDURE — 99213 OFFICE O/P EST LOW 20 MIN: CPT | Performed by: FAMILY MEDICINE

## 2023-03-27 RX ORDER — NITROFURANTOIN 25; 75 MG/1; MG/1
100 CAPSULE ORAL 2 TIMES DAILY
Qty: 10 CAPSULE | Refills: 0 | Status: SHIPPED | OUTPATIENT
Start: 2023-03-27 | End: 2023-04-01

## 2023-03-27 ASSESSMENT — ENCOUNTER SYMPTOMS
CHILLS: 0
VOMITING: 0
NAUSEA: 0
DIARRHEA: 0
BACK PAIN: 0
DIFFICULTY URINATING: 0
FEVER: 0
ABDOMINAL PAIN: 0
HEMATURIA: 0
FLANK PAIN: 0
CONSTIPATION: 0
FREQUENCY: 1
DYSURIA: 1

## 2023-03-27 NOTE — PROGRESS NOTES
"      Subjective      Patient ID: Toño Knight is a 63 y.o. female.  1960      HPI    Here today for UTI symptoms. Urine is cloudy, urgency, frequency, pressure. Started yesterday. No fever.      The following have been reviewed and updated as appropriate in this visit:        Review of Systems   Constitutional: Negative for chills and fever.   Gastrointestinal: Negative for abdominal pain, constipation, diarrhea, nausea and vomiting.   Genitourinary: Positive for dysuria, frequency and urgency. Negative for difficulty urinating, flank pain, genital sores and hematuria.   Musculoskeletal: Negative for back pain.       Objective     Vitals:    03/27/23 1129   BP: (!) 140/98   BP Location: Left upper arm   Patient Position: Sitting   Pulse: 72   Resp: 16   Temp: 36.3 °C (97.3 °F)   TempSrc: Temporal   SpO2: 99%   Weight: 55.5 kg (122 lb 6.4 oz)   Height: 1.721 m (5' 7.75\")     Body mass index is 18.75 kg/m².    Physical Exam  Constitutional:       General: She is not in acute distress.     Appearance: She is well-developed.   Cardiovascular:      Rate and Rhythm: Normal rate and regular rhythm.      Heart sounds: No murmur heard.  Pulmonary:      Effort: Pulmonary effort is normal.      Breath sounds: Normal breath sounds. No wheezing.   Abdominal:      General: Bowel sounds are normal. There is no distension.      Palpations: Abdomen is soft. There is no mass.      Tenderness: There is no abdominal tenderness. There is no guarding or rebound.      Hernia: No hernia is present.         Assessment/Plan   Diagnoses and all orders for this visit:    UTI symptoms (Primary)  Assessment & Plan:  UA positive for leuks and blood--will treat with macrobid. Urine sent for culture.  Continue to make sure staying hydrated.  Call if any changes or concerns.    Orders:  -     POCT urinalysis dipstick  -     Urine culture    Other orders  -     nitrofurantoin, macrocrystal-monohydrate, (MACROBID) 100 mg capsule; Take 1 " capsule (100 mg total) by mouth 2 (two) times a day for 5 days.

## 2023-03-30 LAB — BACTERIA UR CULT: ABNORMAL

## 2023-11-03 RX ORDER — LEVOTHYROXINE SODIUM 75 UG/1
TABLET ORAL
Qty: 90 TABLET | Refills: 3 | Status: SHIPPED | OUTPATIENT
Start: 2023-11-03 | End: 2024-11-05

## 2023-11-06 ENCOUNTER — TELEMEDICINE (OUTPATIENT)
Dept: FAMILY MEDICINE | Facility: CLINIC | Age: 63
End: 2023-11-06
Payer: COMMERCIAL

## 2023-11-06 DIAGNOSIS — J06.9 VIRAL URI WITH COUGH: Primary | ICD-10-CM

## 2023-11-06 PROCEDURE — 99213 OFFICE O/P EST LOW 20 MIN: CPT | Mod: 95 | Performed by: FAMILY MEDICINE

## 2023-11-06 ASSESSMENT — ENCOUNTER SYMPTOMS
COUGH: 1
SHORTNESS OF BREATH: 0
RHINORRHEA: 1

## 2023-11-06 NOTE — PROGRESS NOTES
Verification of Patient Location:  The patient affirms they are currently located in the following state: [place]    Request for Consent:    Audio and Video Encounter   Remi, my name is Ольга BELLAGilbert Tom DO.  Before we proceed, can you please verify your identification by telling me your full name and date of birth?  Can you tell me who is in the room with you?    You and I are about to have a telemedicine check-in or visit because you have requested it.  This is a live video-conference.  I am a real person, speaking to you in real time.  There is no one else with me on the video-conference. I am not recording this conversation and I am asking you not to record it.  This telemedicine visit will be billed to your health insurance or you, if you are self-insured.  You understand you will be responsible for any copayments or coinsurances that apply to your telemedicine visit.  Communication platform used for this encounter:  [employer] Video Visit (Epic Video Client)       Before starting our telemedicine visit, I am required to get your consent for this virtual check-in or visit by telemedicine. Do you consent?      Patient Response to Request for Consent:  Yes      Visit Documentation:  Subjective     Patient ID: Toño Knight is a 63 y.o. female.  1960      Day 5  Last night woke up sneezing    Cough  This is a new problem. The current episode started in the past 7 days. The problem has been gradually worsening. The cough is productive of sputum. Associated symptoms include nasal congestion, postnasal drip and rhinorrhea. Pertinent negatives include no shortness of breath. Nothing aggravates the symptoms. She has tried OTC cough suppressant for the symptoms. The treatment provided mild relief.       The following have been reviewed and updated as appropriate in this visit:   Allergies  Meds  Problems       Review of Systems   HENT: Positive for postnasal drip and rhinorrhea.    Respiratory: Positive for  cough. Negative for shortness of breath.          Assessment/Plan   Diagnoses and all orders for this visit:    Viral URI with cough (Primary)    continue mucinex, can add claritin and flonase.  Follow up for worse symptoms.   Discussed illness being viral and antibiotic not being useful at this time.  Reviewed signs and symptoms of secondary bacterial infection and patient knows to follow up if these should occur.     Time Spent:  I spent 9 minutes on this date of service performing the following activities: obtaining history, documenting, preparing for visit, obtaining / reviewing records, providing counseling and education, independently reviewing study/studies and coordinating care.

## 2023-11-08 ENCOUNTER — OFFICE VISIT (OUTPATIENT)
Dept: FAMILY MEDICINE | Facility: CLINIC | Age: 63
End: 2023-11-08
Payer: COMMERCIAL

## 2023-11-08 VITALS
RESPIRATION RATE: 16 BRPM | TEMPERATURE: 98.2 F | DIASTOLIC BLOOD PRESSURE: 64 MMHG | HEART RATE: 110 BPM | HEIGHT: 68 IN | BODY MASS INDEX: 18.49 KG/M2 | WEIGHT: 122 LBS | SYSTOLIC BLOOD PRESSURE: 122 MMHG | OXYGEN SATURATION: 92 %

## 2023-11-08 DIAGNOSIS — R39.9 UTI SYMPTOMS: Primary | ICD-10-CM

## 2023-11-08 LAB
BACTERIA, POC: ABNORMAL
BILIRUBIN, POC: NEGATIVE
BLOOD URINE, POC: POSITIVE
CLARITY, POC: ABNORMAL
COLOR, POC: YELLOW
EXPIRATION DATE: ABNORMAL
GLUCOSE URINE, POC: NEGATIVE
KETONES, POC: NEGATIVE
LEUKOCYTE EST, POC: ABNORMAL
Lab: ABNORMAL
NITRITE, POC: NEGATIVE
PH, POC: 6
POCT MANUFACTURER: ABNORMAL
PROTEIN, POC: ABNORMAL
SPECIFIC GRAVITY, POC: 1.01
UROBILINOGEN, POC: 0.2

## 2023-11-08 PROCEDURE — 99213 OFFICE O/P EST LOW 20 MIN: CPT | Performed by: FAMILY MEDICINE

## 2023-11-08 PROCEDURE — 3008F BODY MASS INDEX DOCD: CPT | Performed by: FAMILY MEDICINE

## 2023-11-08 PROCEDURE — 81002 URINALYSIS NONAUTO W/O SCOPE: CPT | Performed by: FAMILY MEDICINE

## 2023-11-08 RX ORDER — NITROFURANTOIN 25; 75 MG/1; MG/1
100 CAPSULE ORAL 2 TIMES DAILY
Qty: 10 CAPSULE | Refills: 0 | Status: SHIPPED | OUTPATIENT
Start: 2023-11-08 | End: 2023-11-13

## 2023-11-08 NOTE — PROGRESS NOTES
"Subjective      Patient ID: Toño Knight is a 63 y.o. female is here for the following:    UTI symptoms  - Dysuria, itchiness, increased frequency  - History of UTI, last 3/2023  - No fever, chills, low back pain      The following have been reviewed and updated as appropriate in this visit:   Tobacco  Allergies  Meds  Problems  Med Hx  Surg Hx  Fam Hx         Patient Active Problem List   Diagnosis    Malignant neoplasm of breast (CMS/HCC)    Complication of bone marrow transplant (CMS/HCC)    Nveni-doyqgj-qhmn disease (CMS/HCC)    Acquired hypothyroidism    Liver lesion    MDS (myelodysplastic syndrome) (CMS/HCC)    S/P allogeneic bone marrow transplant (CMS/HCC)    Vitamin D deficiency    UTI symptoms       Social History     Tobacco Use    Smoking status: Never    Smokeless tobacco: Never   Substance Use Topics    Alcohol use: Not Currently       Allergies as of 11/08/2023 - Reviewed 11/08/2023   Allergen Reaction Noted    No known drug allergies  12/01/2006         Current Outpatient Medications:     alendronate (FOSAMAX) 70 mg tablet, TAKE 1 TABLET ONCE A WEEK, Disp: , Rfl: 3    cholecalciferol, vitamin D3, 25 mcg (1,000 unit) capsule, Take 1,000 Units by mouth daily., Disp: , Rfl:     levothyroxine (SYNTHROID) 75 mcg tablet, TAKE 1 TABLET BY MOUTH EVERY DAY, Disp: 90 tablet, Rfl: 3    nitrofurantoin, macrocrystal-monohydrate, (MACROBID) 100 mg capsule, Take 1 capsule (100 mg total) by mouth 2 (two) times a day for 5 days., Disp: 10 capsule, Rfl: 0    Review of systems  Per HPI.    Objective   Vitals:    11/08/23 1007   BP: 122/64   BP Location: Left upper arm   Patient Position: Sitting   Pulse: (!) 110   Resp: 16   Temp: 36.8 °C (98.2 °F)   TempSrc: Temporal   SpO2: 92%   Weight: 55.3 kg (122 lb)   Height: 1.721 m (5' 7.76\")     Body mass index is 18.68 kg/m².    Physical Exam  Vitals reviewed.   Constitutional:       General: She is not in acute distress.     Appearance: She is " well-developed. She is not toxic-appearing.   Cardiovascular:      Rate and Rhythm: Normal rate and regular rhythm.      Heart sounds: Normal heart sounds.   Pulmonary:      Effort: Pulmonary effort is normal. No respiratory distress.      Breath sounds: Normal breath sounds.   Abdominal:      Tenderness: There is no right CVA tenderness or left CVA tenderness.   Neurological:      Mental Status: She is alert.   Psychiatric:         Mood and Affect: Mood normal.         Behavior: Behavior normal.         Thought Content: Thought content normal.         Judgment: Judgment normal.       No images are attached to the encounter.     Assessment/Plan   Problem List Items Addressed This Visit        Genitourinary    UTI symptoms - Primary  UTI symptoms for few days  No fevers or chills, no low back pain  UA with WBCs, RBCs, protein    - Start Macrobid x5 days, check urine culture    Relevant Orders    POCT urinalysis dipstick (Completed)       I spent 20 minutes on this date of service performing the following activities: obtaining history, performing examination, entering orders, documenting and providing counseling and education.      Sadiq Schroeder MD  11/8/2023    Portions of the above dictation were performed using Dragon dictation software.  Please excuse any typos or grammatical errors.

## 2023-11-12 LAB — BACTERIA UR CULT: ABNORMAL

## 2023-11-17 ENCOUNTER — TELEPHONE (OUTPATIENT)
Dept: FAMILY MEDICINE | Facility: CLINIC | Age: 63
End: 2023-11-17
Payer: COMMERCIAL

## 2023-11-17 NOTE — TELEPHONE ENCOUNTER
Patient scheduled for Monday with Dr. Tom. Will likely go to the  over the weekend and may cancel Monday. She will call back to cancel.

## 2023-11-17 NOTE — TELEPHONE ENCOUNTER
[facility] Appointment Request   Provider: Dr Mcwilliams or any provider  Appointment Type: sds  Reason for Visit: lingering cough and chest congestion x 2 wks  Available Day and Time: 11/20/23 anytime  Best Contact Number: [phone]    The practice will reach out to schedule your appointment within the next 2 business days.

## 2023-11-20 ENCOUNTER — OFFICE VISIT (OUTPATIENT)
Dept: FAMILY MEDICINE | Facility: CLINIC | Age: 63
End: 2023-11-20
Payer: COMMERCIAL

## 2023-11-20 VITALS
OXYGEN SATURATION: 96 % | BODY MASS INDEX: 18.49 KG/M2 | DIASTOLIC BLOOD PRESSURE: 70 MMHG | WEIGHT: 122 LBS | HEIGHT: 68 IN | TEMPERATURE: 98.3 F | HEART RATE: 74 BPM | SYSTOLIC BLOOD PRESSURE: 100 MMHG | RESPIRATION RATE: 16 BRPM

## 2023-11-20 DIAGNOSIS — J18.9 WALKING PNEUMONIA: Primary | ICD-10-CM

## 2023-11-20 PROCEDURE — 3008F BODY MASS INDEX DOCD: CPT | Performed by: FAMILY MEDICINE

## 2023-11-20 PROCEDURE — 99213 OFFICE O/P EST LOW 20 MIN: CPT | Performed by: FAMILY MEDICINE

## 2023-11-20 RX ORDER — AZITHROMYCIN 250 MG/1
TABLET, FILM COATED ORAL
Qty: 6 TABLET | Refills: 0 | Status: SHIPPED | OUTPATIENT
Start: 2023-11-20 | End: 2024-10-08

## 2023-11-20 ASSESSMENT — ENCOUNTER SYMPTOMS
COUGH: 1
CHILLS: 0
RHINORRHEA: 0
HEARTBURN: 0
HEADACHES: 0

## 2023-11-20 NOTE — PROGRESS NOTES
"  Subjective     Patient ID: Toño Knight is a 63 y.o. female.    Low grade fevers off and on.  Went to a  on Friday and felt worse after, but slight improvement today.  All in chest    Cough  This is a new problem. The current episode started 1 to 4 weeks ago. The problem has been waxing and waning. The problem occurs every few minutes. The cough is productive of sputum. Pertinent negatives include no chest pain, chills, ear congestion, headaches, heartburn or rhinorrhea. Nothing aggravates the symptoms. She has tried OTC cough suppressant for the symptoms. The treatment provided no relief.       Review of Systems   Constitutional: Negative for chills.   HENT: Negative for rhinorrhea.    Respiratory: Positive for cough.    Cardiovascular: Negative for chest pain.   Gastrointestinal: Negative for heartburn.   Neurological: Negative for headaches.       Objective     Vitals:    23 1446   BP: 100/70   BP Location: Right upper arm   Patient Position: Sitting   Pulse: 74   Resp: 16   Temp: 36.8 °C (98.3 °F)   SpO2: 96%   Weight: 55.3 kg (122 lb)   Height: 1.721 m (5' 7.76\")     Body mass index is 18.68 kg/m².    Physical Exam  Vitals reviewed.   Constitutional:       General: She is not in acute distress.     Appearance: Normal appearance. She is normal weight. She is not ill-appearing, toxic-appearing or diaphoretic.   HENT:      Head: Normocephalic and atraumatic.      Right Ear: Tympanic membrane, ear canal and external ear normal.      Left Ear: Tympanic membrane, ear canal and external ear normal.   Cardiovascular:      Rate and Rhythm: Normal rate and regular rhythm.      Pulses: Normal pulses.      Heart sounds: Normal heart sounds. No murmur heard.     No friction rub. No gallop.   Pulmonary:      Effort: Pulmonary effort is normal. No respiratory distress.      Breath sounds: No stridor. Examination of the right-lower field reveals rhonchi. Rhonchi present. No wheezing or rales.   Chest:      " Chest wall: No tenderness.   Musculoskeletal:      Cervical back: No rigidity. No muscular tenderness.      Right lower leg: No edema.      Left lower leg: No edema.   Lymphadenopathy:      Cervical: No cervical adenopathy.   Skin:     General: Skin is warm.      Coloration: Skin is not pale.   Neurological:      General: No focal deficit present.      Mental Status: She is alert and oriented to person, place, and time. Mental status is at baseline.      Gait: Gait normal.   Psychiatric:         Mood and Affect: Mood normal.         Behavior: Behavior normal.         Judgment: Judgment normal.         Assessment/Plan   Diagnoses and all orders for this visit:    Walking pneumonia (Primary)  -     azithromycin (ZITHROMAX) 250 mg tablet; Take 2 tablets the first day, then 1 tablet daily for 4 days.    Patient will notify me with any questions or issues. Educated patient on newly prescribed medication including side effects. Patient verbalized understanding.  Treatment goals reviewed     CXR if no relief.

## 2023-11-28 ENCOUNTER — TELEPHONE (OUTPATIENT)
Dept: FAMILY MEDICINE | Facility: CLINIC | Age: 63
End: 2023-11-28
Payer: COMMERCIAL

## 2023-11-28 NOTE — TELEPHONE ENCOUNTER
msg: Returning call for patient, she was recently seen and given zpac. After one pill she states that it was too much for her, however she now believes that one pill knocked whatever she had out of her system. She did not complete the course, just the one pill. She states that the pill made her feel wonky, took her breathe away and her heart felt like it was pounding. As of this call she is feeling much better and no need for any other medications at this time.

## 2024-10-03 SDOH — ECONOMIC STABILITY: FOOD INSECURITY: WITHIN THE PAST 12 MONTHS, THE FOOD YOU BOUGHT JUST DIDN'T LAST AND YOU DIDN'T HAVE MONEY TO GET MORE.: NEVER TRUE

## 2024-10-03 SDOH — ECONOMIC STABILITY: TRANSPORTATION INSECURITY
IN THE PAST 12 MONTHS, HAS THE LACK OF TRANSPORTATION KEPT YOU FROM MEDICAL APPOINTMENTS OR FROM GETTING MEDICATIONS?: NO

## 2024-10-03 SDOH — ECONOMIC STABILITY: INCOME INSECURITY: IN THE LAST 12 MONTHS, WAS THERE A TIME WHEN YOU WERE NOT ABLE TO PAY THE MORTGAGE OR RENT ON TIME?: NO

## 2024-10-03 SDOH — ECONOMIC STABILITY: FOOD INSECURITY: WITHIN THE PAST 12 MONTHS, YOU WORRIED THAT YOUR FOOD WOULD RUN OUT BEFORE YOU GOT MONEY TO BUY MORE.: NEVER TRUE

## 2024-10-03 SDOH — ECONOMIC STABILITY: TRANSPORTATION INSECURITY
IN THE PAST 12 MONTHS, HAS LACK OF TRANSPORTATION KEPT YOU FROM MEETINGS, WORK, OR FROM GETTING THINGS NEEDED FOR DAILY LIVING?: NO

## 2024-10-03 ASSESSMENT — SOCIAL DETERMINANTS OF HEALTH (SDOH): IN THE PAST 12 MONTHS, HAS THE ELECTRIC, GAS, OIL, OR WATER COMPANY THREATENED TO SHUT OFF SERVICE IN YOUR HOME?: NO

## 2024-10-08 ENCOUNTER — OFFICE VISIT (OUTPATIENT)
Dept: FAMILY MEDICINE | Facility: CLINIC | Age: 64
End: 2024-10-08
Payer: MEDICARE

## 2024-10-08 VITALS
RESPIRATION RATE: 18 BRPM | HEART RATE: 72 BPM | DIASTOLIC BLOOD PRESSURE: 80 MMHG | OXYGEN SATURATION: 99 % | WEIGHT: 125.6 LBS | HEIGHT: 68 IN | TEMPERATURE: 97.2 F | SYSTOLIC BLOOD PRESSURE: 130 MMHG | BODY MASS INDEX: 19.04 KG/M2

## 2024-10-08 DIAGNOSIS — D46.9 MDS (MYELODYSPLASTIC SYNDROME) (CMS/HCC): ICD-10-CM

## 2024-10-08 DIAGNOSIS — Z23 FLU VACCINE NEED: ICD-10-CM

## 2024-10-08 DIAGNOSIS — M81.0 AGE-RELATED OSTEOPOROSIS WITHOUT CURRENT PATHOLOGICAL FRACTURE: ICD-10-CM

## 2024-10-08 DIAGNOSIS — E03.9 ACQUIRED HYPOTHYROIDISM: ICD-10-CM

## 2024-10-08 DIAGNOSIS — D89.813: ICD-10-CM

## 2024-10-08 DIAGNOSIS — Z94.81 S/P ALLOGENEIC BONE MARROW TRANSPLANT (CMS/HCC): ICD-10-CM

## 2024-10-08 DIAGNOSIS — Z12.11 COLON CANCER SCREENING: ICD-10-CM

## 2024-10-08 DIAGNOSIS — Z00.00 INITIAL MEDICARE ANNUAL WELLNESS VISIT: Primary | ICD-10-CM

## 2024-10-08 DIAGNOSIS — C50.919 MALIGNANT NEOPLASM OF FEMALE BREAST, UNSPECIFIED ESTROGEN RECEPTOR STATUS, UNSPECIFIED LATERALITY, UNSPECIFIED SITE OF BREAST (CMS/HCC): ICD-10-CM

## 2024-10-08 PROCEDURE — G0008 ADMIN INFLUENZA VIRUS VAC: HCPCS | Performed by: FAMILY MEDICINE

## 2024-10-08 PROCEDURE — G0438 PPPS, INITIAL VISIT: HCPCS | Performed by: FAMILY MEDICINE

## 2024-10-08 PROCEDURE — 90662 IIV NO PRSV INCREASED AG IM: CPT | Performed by: FAMILY MEDICINE

## 2024-10-08 ASSESSMENT — PATIENT HEALTH QUESTIONNAIRE - PHQ9: SUM OF ALL RESPONSES TO PHQ9 QUESTIONS 1 & 2: 0

## 2024-10-08 NOTE — PATIENT INSTRUCTIONS
Your Personalized Prevention Plan Services (PPPS)    Preventive Services Checklist (Assumes Average Risk Unless Otherwise Noted):    Health Maintenance Topics with due status: Overdue       Topic Date Due    HIV Screening Never done    RSV Vaccine Never done    Colorectal Cancer Screening 11/20/2022    Hepatitis B Vaccines 01/16/2023    IPV Vaccines 03/19/2023    DEXA Scan 11/24/2023    Influenza Vaccine 08/01/2024    COVID-19 Vaccine 09/01/2024     Health Maintenance Topics with due status: Not Due       Topic Last Completion Date    DTaP, Tdap, and Td Vaccines 01/24/2018    Pneumococcal (65 years and older) 07/15/2021    Cervical Cancer Screening 01/08/2024    Medicare Annual Wellness Visit 10/08/2024    Depression Screening 10/08/2024    Falls Risk Screening 10/08/2024     Health Maintenance Topics with due status: Completed       Topic Last Completion Date    Zoster Vaccine 11/29/2021     Health Maintenance Topics with due status: Aged Out       Topic Date Due    Meningococcal ACWY Aged Out    RSV <20 months Aged Out    HIB Vaccines Aged Out    HPV Vaccines Aged Out       You May Be Eligible for These Additional Preventive Services   (Assumes Average Risk Unless Otherwise Noted)  Diabetes Screening Any 1 risk factor: hypertension, dyslipidemia, obesity, high glucose; or Any 2 risk factors: >=64yo, overweight, family history diabetes (covered every 6 months)   Hepatitis C Screening Any 1 risk factor: 1) blood transfusion before 1992,   2) current or past injection drug use (annually for high risk; if born between 6041-0720, see above for status).   Vaccine: Hepatitis B As necessary if at-risk: hemophilia, ESRD, diabetes, living with individual infected with hep B, healthcare worker with frequent contact with blood/bodily fluids (series covered once)   Sexually Transmitted Diseases (STDs) As necessary chlamydia, gonorrhea, syphilis, hepatitis B (covered annually)  HIV if any 1 risk factor  present: 1) <14yo or >66yo and at increased risk or 2) 15-66yo and ask for it (covered annually)   Lung Cancer Screening Low dose chest CT if all three risk factors: 1) 50-76yo, 2) smoker or quit within last 15y, 3) >=20 pack years (covered annually).  No results found for this or any previous visit.       Cholesterol Screening Both risk factors: 1) >=21yo and 2)  increased risk coronary artery disease (covered every 5 years).   Breast Cancer Screening Covered once 35-38yo, annually >=41yo (if >=49yo, see above for status).       Health Risk Factors with Personalized Education:  ----------------------------------------------------------------------------------------------------------------------  Controlling Your Cholesterol  Reduce the amount of saturated and trans fat in your diet.  Limit intake of red meat.  Consume only low-fat or non-fat/skim dairy.  Limit fried food.  Cook with vegetable oils.  Reduce your intake of sugary foods, sugary drinks and alcohol.  Eat a diet high in fruit, vegetables and whole grains.  Get protein from fish, poultry and a small portion of nuts.  Stay active.  Try to get at least 90 to 150 minutes of exercise per week.  Try brisk walking, swimming, bicycling or dancing.  Maintain a healthy weight by balancing your diet and exercise.  If you have been prescribed medication, take it regularly and exactly as prescribed. Let your PCP know if you have any problems or questions about your medication.  It’s important to know your cholesterol numbers.  When recommended by your PCP, get the cholesterol blood test.  ---------------------------------------------------------------------------------------------------------------------   Controlling Your Osteoporosis, Strengthening Your Bones  Try to get at least 90 to 150 minutes of weight-bearing exercise per week.  Ensure intake of at least 1200mg of calcium per day.  Eat foods high in calcium like milk and other dairy, green vegetables, fruit,  canned fish with soft and edible bones, nuts, calcium-set tofu.  Some foods are calcium-fortified, like bread, cereal, fruit juices and mineral water.  Help your body make vitamin D by getting 10-15 minutes per day of sunlight.    Ensure intake of at least 600IU of vitamin D per day.  Eat foods high in vitamin D like oily fish (salmon, sardines, mackerel) and eggs.  Some foods are fortified with vitamin D, like dairy and cereals.  Avoid high amounts of caffeine and salt, since they can cause the body to loose calcium.  Limit alcohol intake, since it is associated with weaker bones and is associated with falls and fractures.  Limit intake of fizzy drinks.  If you have been prescribed medication, take it regularly and exactly as prescribed.  Let your PCP know if you have any problems or questions about your medication  ----------------------------------------------------------------------------------------------------------------------  Reducing Your Risk of Falls  Tell your PCP if any of your medications make you feel tired, dizzy, lightheaded or off-balance.  Maintain coordination, flexibility and balance by ensuring regular physical activity.  Limit alcohol intake to 1 drink per day.  Consider avoiding all alcohol intake.  Ensure good vision.  Visit an ophthalmologist or optometrist regularly for vision screening or to make sure your glasses / contact lens prescription is correct.  If you need glasses or contacts, wear them.  When you get new glasses or contacts, take time to get used to them.  Do not wear sunglasses or tinted lenses when indoors.  Ensure good hearing.  Have your hearing checked if you are having trouble hearing, or family and friends think you cannot hear them.  If you need a hearing aid, be sure it fits well and wear it.  Get enough rest.  Ensure about 7-9 hours of sleep every day.  Get up slowly from your bed or chairs.  Do not start walking until you are sure you feel steady.  Wear non-skid,  rubber-soled, low-heeled shoes.  Do not walk in socks, or in shoes and slippers with smooth soles.  If your PCP or therapist recommends using a cane or walker, use it regularly.  Make your home safer.  Increase lighting throughout the house, especially at the top and bottom of stairs.  Ensure lighting is easily turned on when getting up in the middle of the night.  Make sure there are two secure rails on all stairs.  Install grab bars in the bathtub / shower and near the toilet.  Consider using a shower chair and / or a hand-held shower.  Spread sand or salt on icy surfaces.  Beware of wet surfaces, which can be icy.  Tell your PCP if you have fallen.

## 2024-10-08 NOTE — PROGRESS NOTES
Pt is a 64 y.o. female presenting for her annual well visit. Denies any acute illnesses or concerns today.    Labs on 9/23/24 were significant for:   - elevated cholesterole (215)   - elevated LDL (134), though has been steadily decreasing   - TSH low (0.3) but T4 WNL (1.8)    She started eating chapis seeds daily for cholesterol, exercises 5 days a week.    She feels more fatigued then prior to her MDS diagnosis. Unsure if this is froma ge of the diagnosis.    Health Maintenance:   - colonoscopy due (pt requests scrip)   - DEXA (last in 2021, osteoporosis) due   - Pap UTD  Vaccines:   - Flu   - Covid   - IPV (3/3) - 3rd dose given too close to second   - RSV    Future labs:   - lipid panel   - CBC   - CMP   - Vit. D   - B12

## 2024-10-08 NOTE — PROGRESS NOTES
Subjective     Toño Knight is a 64 y.o. female who presents for a welcome to Medicare wellness visit.     Pt is a 64 y.o. female presenting for her annual well visit. Denies any acute illnesses or concerns today.     Labs on 9/23/24 were significant for:              - elevated cholesterole (215)              - elevated LDL (134), though has been steadily decreasing              - TSH low (0.3) but T4 WNL (1.8)     She started eating cahpis seeds daily for cholesterol, exercises 5 days a week.     She feels more fatigued then prior to her MDS diagnosis. Unsure if this is from age or the diagnosis.     Health Maintenance:              - colonoscopy due (pt requests scrip)              - DEXA (last in 2021, osteoporosis) due              - Pap UTD  Vaccines:              - Flu              - Covid              - IPV (3/3) - 3rd dose given too close to second              - RSV     Future labs:              - lipid panel              - CBC              - CMP              - Vit. D              - B12    Patient Care Team:  Abdulkadir Mcwilliams DO as PCP - Gopal Monroy CRNP as Referring Physician (Obstetrics and Gynecology)    Comprehensive Medical and Social History  Patient Active Problem List   Diagnosis    Malignant neoplasm of breast (CMS/HCC)    Complication of bone marrow transplant (CMS/HCC)    Apbfw-ogaaqu-dosz disease (CMS/HCC)    Acquired hypothyroidism    Liver lesion    MDS (myelodysplastic syndrome) (CMS/HCC)    S/P allogeneic bone marrow transplant (CMS/HCC)    Vitamin D deficiency    UTI symptoms     Past Medical History:   Diagnosis Date    MDS (myelodysplastic syndrome) (CMS/HCC)     Melanoma (CMS/HCC) 11/01/2020     No past surgical history on file.  Allergies   Allergen Reactions    No Known Drug Allergies      Current Outpatient Medications   Medication Sig Dispense Refill    alendronate (FOSAMAX) 70 mg tablet TAKE 1 TABLET ONCE A WEEK  3    cholecalciferol, vitamin D3, 25 mcg (1,000 unit)  "capsule Take 1,000 Units by mouth daily.      levothyroxine (SYNTHROID) 75 mcg tablet TAKE 1 TABLET BY MOUTH EVERY DAY 90 tablet 3     No current facility-administered medications for this visit.     Social History     Tobacco Use    Smoking status: Never    Smokeless tobacco: Never   Vaping Use    Vaping status: Never Used   Substance Use Topics    Alcohol use: Not Currently    Drug use: Never     No family history on file.    Objective   Vitals  Vitals:    10/08/24 1110   BP: 130/80   BP Location: Left upper arm   Patient Position: Sitting   Pulse: 72   Resp: 18   Temp: 36.2 °C (97.2 °F)   TempSrc: Temporal   SpO2: 99%   Weight: 57 kg (125 lb 9.6 oz)   Height: 1.727 m (5' 8\")     Body mass index is 19.1 kg/m².    Advanced Care Plan  Does patient have advance directive?: No   Patient does not have Advance Directive: [facility] Advance Care Planning brochure provided                                 PHQ  Will the patient answer the depression questions?: Yes   Little interest or pleasure in doing things: Not at all   Feeling down, depressed, or hopeless: Not at all   Depression Risk: 0                                             Mini Cog         Get Up and Go  Result: Pass    STEADI Falls Risk  One or more falls in the last year: No           Has trouble stepping up onto a curb: No   Advised to use a cane or walker to get around safely: No   Often has to rush to the toilet: No   Feels unsteady when walking: No   Has lost some feeling in feet: No   Often feels sad or depressed: No   Steadies self on furniture while walking at home: No   Takes medication that makes him/her feel lightheaded or more tired than usual: No   Worried about falling: No   Takes medicine to sleep or improve mood: No   Needs to push with hands when rising from a chair: No   Falls screen completed: Yes     Hearing and Vision Screening  No results found.  See HRA for relevant hearing screening response.    Diet and Exercise            Assessment/Plan "   Diagnoses and all orders for this visit:    Initial Medicare annual wellness visit (Primary)    MDS (myelodysplastic syndrome) (CMS/[facility])  Assessment & Plan:             Zcoiv-jvuubs-lppz disease (CMS/[facility])  Assessment & Plan:             S/P allogeneic bone marrow transplant (CMS/[facility])  Assessment & Plan:             Malignant neoplasm of female breast, unspecified estrogen receptor status, unspecified laterality, unspecified site of breast (CMS/[facility])  Assessment & Plan:             Age-related osteoporosis without current pathological fracture  -     DEXA BONE DENSITY; Future    Flu vaccine need  -     Influenza vaccine high dose trivalent 65 and older IM (Fluzone High Dose)    Colon cancer screening  -     Direct Access Colonoscopy [facility]      Flu vaccine given today, Dexa and colonoscopy ordered. Will keep levothyroxine at current dose of 75 mcg daily as patient is doing well.  Continue Fosamax for osteoporosis.    See Patient Instructions (the written plan) which was given to the patient for PPPS and health risk factors with interventions.

## 2024-10-30 ENCOUNTER — HOSPITAL ENCOUNTER (OUTPATIENT)
Dept: RADIOLOGY | Facility: HOSPITAL | Age: 64
Discharge: HOME | End: 2024-10-30
Attending: FAMILY MEDICINE
Payer: MEDICARE

## 2024-10-30 DIAGNOSIS — M81.0 AGE-RELATED OSTEOPOROSIS WITHOUT CURRENT PATHOLOGICAL FRACTURE: ICD-10-CM

## 2024-10-30 PROCEDURE — 77080 DXA BONE DENSITY AXIAL: CPT

## 2024-10-31 NOTE — RESULT ENCOUNTER NOTE
Mariluz,    Your bone density scan does continue to show osteoporosis with no change in right hip but worsening in left hip and spine since 2021.  I would recommend continuing the Fosamax, weightbearing exercise, vitamin D.  If you would like to consult with an endocrinologist or rheumatologist they do have alternative therapies that are a bit more aggressive at stopping the osteoporosis.  Let me know and I can give you some names.    Take care.    Dr. Mcwilliams

## 2024-11-02 DIAGNOSIS — E03.9 ACQUIRED HYPOTHYROIDISM: Primary | ICD-10-CM

## 2024-11-05 RX ORDER — LEVOTHYROXINE SODIUM 75 UG/1
TABLET ORAL
Qty: 90 TABLET | Refills: 3 | Status: SHIPPED | OUTPATIENT
Start: 2024-11-05

## 2024-11-22 ENCOUNTER — OFFICE VISIT (OUTPATIENT)
Dept: FAMILY MEDICINE | Facility: CLINIC | Age: 64
End: 2024-11-22
Payer: MEDICARE

## 2024-11-22 VITALS
SYSTOLIC BLOOD PRESSURE: 144 MMHG | WEIGHT: 125 LBS | RESPIRATION RATE: 16 BRPM | BODY MASS INDEX: 18.94 KG/M2 | HEIGHT: 68 IN | TEMPERATURE: 98 F | HEART RATE: 83 BPM | DIASTOLIC BLOOD PRESSURE: 80 MMHG

## 2024-11-22 DIAGNOSIS — R39.9 UTI SYMPTOMS: Primary | ICD-10-CM

## 2024-11-22 LAB
BACTERIA, POC: ABNORMAL
BILIRUBIN, POC: NEGATIVE
BLOOD URINE, POC: NEGATIVE
CLARITY, POC: CLEAR
COLOR, POC: YELLOW
EXPIRATION DATE: ABNORMAL
GLUCOSE URINE, POC: NEGATIVE
KETONES, POC: NEGATIVE
LEUKOCYTE EST, POC: ABNORMAL
Lab: ABNORMAL
NITRITE, POC: NEGATIVE
PH, POC: 6
POCT MANUFACTURER: ABNORMAL
PROTEIN, POC: NEGATIVE
SPECIFIC GRAVITY, POC: 1.01
UROBILINOGEN, POC: 1

## 2024-11-22 PROCEDURE — 99213 OFFICE O/P EST LOW 20 MIN: CPT | Performed by: FAMILY MEDICINE

## 2024-11-22 PROCEDURE — 81002 URINALYSIS NONAUTO W/O SCOPE: CPT | Performed by: FAMILY MEDICINE

## 2024-11-22 RX ORDER — NITROFURANTOIN 25; 75 MG/1; MG/1
100 CAPSULE ORAL 2 TIMES DAILY
Qty: 10 CAPSULE | Refills: 0 | Status: SHIPPED | OUTPATIENT
Start: 2024-11-22 | End: 2024-11-27

## 2024-11-22 NOTE — PROGRESS NOTES
Subjective      Patient ID: Toño Knight is a 64 y.o. female is here for the following:    UTI symptoms  -Thought it first began last week as she had some bladder pressure and dysuria, she thought the symptoms went away with hydration and eating cranberries  -Symptoms then recurred yesterday morning and have now worsened as she states that she does not feel right overall, has bladder pressure and some low back ache  - No significant dysuria, frequency, urgency  - No fever or chills  -Macrobid has worked well in the past for UTI symptoms    UA with 2+ leukocytes        The following have been reviewed and updated as appropriate in this visit:   Tobacco  Allergies  Meds  Problems  Med Hx  Surg Hx  Fam Hx         Patient Active Problem List   Diagnosis    Malignant neoplasm of breast (CMS/HCC)    Complication of bone marrow transplant (CMS/HCC)    Fmsqi-ruwrhk-anvf disease (CMS/HCC)    Acquired hypothyroidism    Liver lesion    MDS (myelodysplastic syndrome) (CMS/HCC)    S/P allogeneic bone marrow transplant (CMS/HCC)    Vitamin D deficiency    UTI symptoms       Social History     Tobacco Use    Smoking status: Never    Smokeless tobacco: Never   Vaping Use    Vaping status: Never Used   Substance Use Topics    Alcohol use: Not Currently    Drug use: Never       Allergies as of 11/22/2024 - Reviewed 11/22/2024   Allergen Reaction Noted    No known drug allergies  12/01/2006         Current Outpatient Medications:     alendronate (FOSAMAX) 70 mg tablet, TAKE 1 TABLET ONCE A WEEK, Disp: , Rfl: 3    cholecalciferol, vitamin D3, 25 mcg (1,000 unit) capsule, Take 1,000 Units by mouth daily., Disp: , Rfl:     levothyroxine (SYNTHROID) 75 mcg tablet, TAKE 1 TABLET BY MOUTH EVERY DAY, Disp: 90 tablet, Rfl: 3    nitrofurantoin, macrocrystal-monohydrate, (MACROBID) 100 mg capsule, Take 1 capsule (100 mg total) by mouth 2 (two) times a day for 5 days., Disp: 10 capsule, Rfl: 0    Review of systems  Per  "HPI.    Objective   Vitals:    11/22/24 1444   BP: (!) 144/80   BP Location: Left upper arm   Patient Position: Sitting   Pulse: 83   Resp: 16   Temp: 36.7 °C (98 °F)   TempSrc: Temporal   Weight: 56.7 kg (125 lb)   Height: 1.727 m (5' 7.99\")     Body mass index is 19.01 kg/m².    Physical Exam  Vitals reviewed.   Constitutional:       General: She is not in acute distress.     Appearance: She is well-developed. She is not toxic-appearing.   Cardiovascular:      Rate and Rhythm: Normal rate and regular rhythm.      Heart sounds: Normal heart sounds.   Pulmonary:      Effort: Pulmonary effort is normal. No respiratory distress.      Breath sounds: Normal breath sounds.   Abdominal:      Tenderness: There is no right CVA tenderness or left CVA tenderness.   Neurological:      Mental Status: She is alert.   Psychiatric:         Mood and Affect: Mood normal.         Behavior: Behavior normal.         Thought Content: Thought content normal.         Judgment: Judgment normal.       No images are attached to the encounter.     Assessment/Plan   Problem List Items Addressed This Visit          Genitourinary    UTI symptoms - Primary  Bladder pressure and achiness since yesterday  She had some other UTI symptoms at the end of last week that resolved since with hydration and cranberries  No fevers or chills  UA with 2+ leukocytes    - Start Macrobid and check urine culture, follow-up if not improving    Relevant Orders    POCT urinalysis dipstick (Completed)    Urine culture       I spent 20 minutes on this date of service performing the following activities: obtaining history, performing examination, entering orders, documenting, and providing counseling and education.      Sadiq Schroeder MD  11/22/2024    Portions of the above dictation were performed using Dragon dictation software.  Please excuse any typos or grammatical errors.  "

## 2024-11-23 LAB — BACTERIA UR CULT: NORMAL

## 2024-12-18 NOTE — TELEPHONE ENCOUNTER
Pt called in today stated that she do have UTI and her symptoms are bladder pressure, frequency and she is requesting for antibitoic. But she can't make it up today, so instruct her to drop off urine sample at office, so we can send it for culture.   Yes

## 2025-07-14 ENCOUNTER — OFFICE VISIT (OUTPATIENT)
Dept: FAMILY MEDICINE | Facility: CLINIC | Age: 65
End: 2025-07-14
Payer: MEDICARE

## 2025-07-14 VITALS
HEART RATE: 83 BPM | RESPIRATION RATE: 16 BRPM | TEMPERATURE: 98.3 F | OXYGEN SATURATION: 99 % | DIASTOLIC BLOOD PRESSURE: 80 MMHG | SYSTOLIC BLOOD PRESSURE: 138 MMHG | WEIGHT: 126 LBS | BODY MASS INDEX: 19.16 KG/M2

## 2025-07-14 DIAGNOSIS — Z94.81 S/P ALLOGENEIC BONE MARROW TRANSPLANT (CMS/HCC): ICD-10-CM

## 2025-07-14 DIAGNOSIS — N30.01 ACUTE CYSTITIS WITH HEMATURIA: Primary | ICD-10-CM

## 2025-07-14 DIAGNOSIS — J06.9 VIRAL URI WITH COUGH: ICD-10-CM

## 2025-07-14 DIAGNOSIS — D46.9 MDS (MYELODYSPLASTIC SYNDROME) (CMS/HCC): ICD-10-CM

## 2025-07-14 DIAGNOSIS — J02.9 SORE THROAT: ICD-10-CM

## 2025-07-14 LAB
BILIRUBIN, POC: NEGATIVE
BLOOD URINE, POC: POSITIVE
CLARITY, POC: ABNORMAL
COLOR, POC: YELLOW
EXPIRATION DATE: ABNORMAL
GLUCOSE URINE, POC: NEGATIVE
KETONES, POC: NEGATIVE
LEUKOCYTE EST, POC: ABNORMAL
Lab: ABNORMAL
NITRITE, POC: NEGATIVE
PH, POC: 7
POCT MANUFACTURER: ABNORMAL
PROTEIN, POC: NEGATIVE
SPECIFIC GRAVITY, POC: 1
UROBILINOGEN, POC: 0.2

## 2025-07-14 PROCEDURE — 81002 URINALYSIS NONAUTO W/O SCOPE: CPT | Performed by: FAMILY MEDICINE

## 2025-07-14 PROCEDURE — 99214 OFFICE O/P EST MOD 30 MIN: CPT | Performed by: FAMILY MEDICINE

## 2025-07-14 RX ORDER — SULFAMETHOXAZOLE AND TRIMETHOPRIM 800; 160 MG/1; MG/1
1 TABLET ORAL 2 TIMES DAILY
Qty: 10 TABLET | Refills: 0 | Status: SHIPPED | OUTPATIENT
Start: 2025-07-14 | End: 2025-07-19

## 2025-07-14 ASSESSMENT — ENCOUNTER SYMPTOMS
COUGH: 1
HEMATURIA: 0
FREQUENCY: 0
FLANK PAIN: 0
SORE THROAT: 1

## 2025-07-14 NOTE — PROGRESS NOTES
Subjective     Patient ID: Toño Knight is a 65 y.o. female.    Also had a sore throat 10 days ago and was told she had white spots.    UTI   This is a new problem. The current episode started in the past 7 days. The quality of the pain is described as burning. Pertinent negatives include no flank pain, frequency, hematuria, hesitancy, possible pregnancy or urgency. She has tried nothing for the symptoms. The treatment provided no relief. There is no history of recurrent UTIs.   Cough  This is a new problem. The current episode started 1 to 4 weeks ago. The problem has been gradually improving. Associated symptoms include a sore throat.       Review of Systems   HENT:  Positive for sore throat.    Respiratory:  Positive for cough.    Genitourinary:  Negative for flank pain, frequency, hematuria, hesitancy and urgency.       Objective     Vitals:    07/14/25 1131   BP: 138/80   BP Location: Right upper arm   Patient Position: Sitting   Pulse: 83   Resp: 16   Temp: 36.8 °C (98.3 °F)   TempSrc: Temporal   SpO2: 99%   Weight: 57.2 kg (126 lb)     Body mass index is 19.16 kg/m².    Physical Exam  Vitals reviewed.   Constitutional:       General: She is not in acute distress.     Appearance: Normal appearance. She is normal weight. She is not ill-appearing, toxic-appearing or diaphoretic.   HENT:      Head: Normocephalic and atraumatic.      Right Ear: Tympanic membrane, ear canal and external ear normal.      Left Ear: Tympanic membrane, ear canal and external ear normal.      Nose: Rhinorrhea present.      Mouth/Throat:      Mouth: Mucous membranes are moist.      Pharynx: No oropharyngeal exudate or posterior oropharyngeal erythema.   Cardiovascular:      Rate and Rhythm: Normal rate and regular rhythm.      Pulses: Normal pulses.      Heart sounds: Normal heart sounds. No murmur heard.     No friction rub. No gallop.   Pulmonary:      Effort: Pulmonary effort is normal. No respiratory distress.      Breath  sounds: Normal breath sounds. No stridor. No wheezing, rhonchi or rales.   Chest:      Chest wall: No tenderness.   Musculoskeletal:      Cervical back: No rigidity. No muscular tenderness.      Right lower leg: No edema.      Left lower leg: No edema.   Lymphadenopathy:      Cervical: No cervical adenopathy.   Skin:     General: Skin is warm.      Coloration: Skin is not pale.   Neurological:      General: No focal deficit present.      Mental Status: She is alert and oriented to person, place, and time. Mental status is at baseline.      Gait: Gait normal.   Psychiatric:         Mood and Affect: Mood normal.         Behavior: Behavior normal.         Judgment: Judgment normal.         Assessment & Plan  Acute cystitis with hematuria  Patient will notify me with any questions or issues. Educated patient on newly prescribed medication including side effects. Patient verbalized understanding.  Treatment goals reviewed   Orders:    sulfamethoxazole-trimethoprim (BACTRIM DS) 800-160 mg per tablet; Take 1 tablet by mouth 2 (two) times a day for 5 days.    Urinalysis with Reflex Culture    Urine culture    MDS (myelodysplastic syndrome) (CMS/[facility])  stable       S/P allogeneic bone marrow transplant (CMS/[facility])         Viral URI with cough  Discussed illness being viral and antibiotic not being useful at this time.  Reviewed signs and symptoms of secondary bacterial infection and patient knows to follow up if these should occur.        Sore throat  Exam is negative, will check culture.  Orders:    Strep throat culture; Future

## 2025-07-16 LAB — S PYO THROAT QL CULT: NORMAL

## 2025-07-17 LAB
APPEARANCE UR: ABNORMAL
BACTERIA #/AREA URNS HPF: ABNORMAL /HPF
BACTERIA UR CULT: ABNORMAL
BACTERIA UR CULT: NORMAL
BILIRUB UR QL STRIP: NEGATIVE
COLOR UR: YELLOW
GLUCOSE UR QL STRIP: NEGATIVE
HGB UR QL STRIP: ABNORMAL
HYALINE CASTS #/AREA URNS LPF: ABNORMAL /LPF
KETONES UR QL STRIP: NEGATIVE
LEUKOCYTE ESTERASE UR QL STRIP: ABNORMAL
NITRITE UR QL STRIP: NEGATIVE
PH UR STRIP: 7 [PH] (ref 5–8)
PROT UR QL STRIP: ABNORMAL
RBC #/AREA URNS HPF: ABNORMAL /HPF
SERVICE CMNT-IMP: ABNORMAL
SP GR UR STRIP: 1.01 (ref 1–1.03)
SQUAMOUS #/AREA URNS HPF: ABNORMAL /HPF
WBC #/AREA URNS HPF: ABNORMAL /HPF

## 2025-08-28 ENCOUNTER — OFFICE VISIT (OUTPATIENT)
Dept: FAMILY MEDICINE | Facility: CLINIC | Age: 65
End: 2025-08-28
Payer: MEDICARE

## 2025-08-28 VITALS
TEMPERATURE: 98.5 F | WEIGHT: 125 LBS | RESPIRATION RATE: 16 BRPM | HEIGHT: 68 IN | DIASTOLIC BLOOD PRESSURE: 70 MMHG | OXYGEN SATURATION: 98 % | BODY MASS INDEX: 18.94 KG/M2 | HEART RATE: 96 BPM | SYSTOLIC BLOOD PRESSURE: 120 MMHG

## 2025-08-28 DIAGNOSIS — J02.9 SORE THROAT: Primary | ICD-10-CM

## 2025-08-28 DIAGNOSIS — L04.0 ACUTE CERVICAL ADENITIS: ICD-10-CM

## 2025-08-28 PROCEDURE — G2211 COMPLEX E/M VISIT ADD ON: HCPCS | Performed by: FAMILY MEDICINE

## 2025-08-28 PROCEDURE — 99213 OFFICE O/P EST LOW 20 MIN: CPT | Performed by: FAMILY MEDICINE

## 2025-08-28 RX ORDER — METHYLPREDNISOLONE 4 MG/1
TABLET ORAL
Qty: 21 TABLET | Refills: 0 | Status: SHIPPED | OUTPATIENT
Start: 2025-08-28

## 2025-08-28 ASSESSMENT — ENCOUNTER SYMPTOMS
SWOLLEN GLANDS: 1
SHORTNESS OF BREATH: 0
TROUBLE SWALLOWING: 1
COUGH: 1
SORE THROAT: 1

## 2025-08-29 LAB
FLUAV RNA RESP QL NAA+PROBE: NOT DETECTED
FLUBV RNA RESP QL NAA+PROBE: NOT DETECTED
RSV RNA RESP QL NAA+PROBE: NOT DETECTED
SARS-COV-2 RNA RESP QL NAA+PROBE: NOT DETECTED

## 2025-08-30 LAB — S PYO THROAT QL CULT: NORMAL

## 2025-09-01 ENCOUNTER — APPOINTMENT (EMERGENCY)
Dept: RADIOLOGY | Facility: HOSPITAL | Age: 65
End: 2025-09-01
Payer: MEDICARE

## 2025-09-01 ENCOUNTER — HOSPITAL ENCOUNTER (EMERGENCY)
Facility: HOSPITAL | Age: 65
Discharge: HOME | End: 2025-09-01
Attending: EMERGENCY MEDICINE | Admitting: EMERGENCY MEDICINE
Payer: MEDICARE

## 2025-09-01 VITALS
HEART RATE: 58 BPM | HEIGHT: 68 IN | WEIGHT: 125 LBS | BODY MASS INDEX: 18.94 KG/M2 | SYSTOLIC BLOOD PRESSURE: 167 MMHG | OXYGEN SATURATION: 98 % | RESPIRATION RATE: 16 BRPM | DIASTOLIC BLOOD PRESSURE: 82 MMHG | TEMPERATURE: 97.7 F

## 2025-09-01 DIAGNOSIS — R51.9 ACUTE NONINTRACTABLE HEADACHE, UNSPECIFIED HEADACHE TYPE: Primary | ICD-10-CM

## 2025-09-01 LAB
ALBUMIN SERPL-MCNC: 4.2 G/DL (ref 3.5–5.7)
ALP SERPL-CCNC: 69 IU/L (ref 34–125)
ALT SERPL-CCNC: 23 IU/L (ref 7–52)
ANION GAP SERPL CALC-SCNC: 9 MEQ/L (ref 3–15)
AST SERPL-CCNC: 18 IU/L (ref 13–39)
BASOPHILS # BLD: 0 K/UL (ref 0.01–0.1)
BASOPHILS NFR BLD: 0 %
BILIRUB SERPL-MCNC: 0.6 MG/DL (ref 0.3–1.2)
BUN SERPL-MCNC: 16 MG/DL (ref 7–25)
BURR CELLS BLD QL SMEAR: ABNORMAL
CALCIUM SERPL-MCNC: 10.4 MG/DL (ref 8.6–10.3)
CHLORIDE SERPL-SCNC: 93 MEQ/L (ref 98–107)
CO2 SERPL-SCNC: 30 MEQ/L (ref 21–31)
CREAT SERPL-MCNC: 0.8 MG/DL (ref 0.6–1.2)
DIFFERENTIAL METHOD BLD: ABNORMAL
EGFRCR SERPLBLD CKD-EPI 2021: >60 ML/MIN/1.73M*2
EOSINOPHIL # BLD: 0 K/UL (ref 0.04–0.36)
EOSINOPHIL NFR BLD: 0 %
ERYTHROCYTE [DISTWIDTH] IN BLOOD BY AUTOMATED COUNT: 13.4 % (ref 11.7–14.4)
GLUCOSE SERPL-MCNC: 126 MG/DL (ref 70–99)
HCT VFR BLD AUTO: 41.5 % (ref 35–45)
HGB BLD-MCNC: 13.9 G/DL (ref 11.8–15.7)
LYMPHOCYTES # BLD: 1.02 K/UL (ref 1.2–3.5)
LYMPHOCYTES NFR BLD: 4 %
MCH RBC QN AUTO: 31 PG (ref 28–33.2)
MCHC RBC AUTO-ENTMCNC: 33.5 G/DL (ref 32.2–35.5)
MCV RBC AUTO: 92.4 FL (ref 83–98)
MONOCYTES # BLD: 2.04 K/UL (ref 0.28–0.8)
MONOCYTES NFR BLD: 8 %
NEUTS BAND # BLD: 22.48 K/UL (ref 1.7–7)
NEUTS SEG NFR BLD: 88 %
PLAT MORPH BLD: NORMAL
PLATELET # BLD AUTO: 375 K/UL (ref 150–369)
PLATELET # BLD EST: ABNORMAL 10*3/UL
PLATELET CLUMP BLD QL SMEAR: PRESENT
PMV BLD AUTO: 8.1 FL (ref 9.4–12.3)
POTASSIUM SERPL-SCNC: 3.5 MEQ/L (ref 3.5–5.1)
PROT SERPL-MCNC: 8 G/DL (ref 6–8.2)
RBC # BLD AUTO: 4.49 M/UL (ref 3.93–5.22)
SODIUM SERPL-SCNC: 132 MEQ/L (ref 136–145)
WBC # BLD AUTO: 25.55 K/UL (ref 3.8–10.5)

## 2025-09-01 PROCEDURE — 70450 CT HEAD/BRAIN W/O DYE: CPT

## 2025-09-01 PROCEDURE — 3E0337Z INTRODUCTION OF ELECTROLYTIC AND WATER BALANCE SUBSTANCE INTO PERIPHERAL VEIN, PERCUTANEOUS APPROACH: ICD-10-PCS | Performed by: EMERGENCY MEDICINE

## 2025-09-01 PROCEDURE — 86618 LYME DISEASE ANTIBODY: CPT | Performed by: PHYSICIAN ASSISTANT

## 2025-09-01 PROCEDURE — 72125 CT NECK SPINE W/O DYE: CPT

## 2025-09-01 PROCEDURE — 3E0333Z INTRODUCTION OF ANTI-INFLAMMATORY INTO PERIPHERAL VEIN, PERCUTANEOUS APPROACH: ICD-10-PCS | Performed by: EMERGENCY MEDICINE

## 2025-09-01 PROCEDURE — 96374 THER/PROPH/DIAG INJ IV PUSH: CPT

## 2025-09-01 PROCEDURE — 25800000 HC PHARMACY IV SOLUTIONS: Performed by: PHYSICIAN ASSISTANT

## 2025-09-01 PROCEDURE — 3E033GC INTRODUCTION OF OTHER THERAPEUTIC SUBSTANCE INTO PERIPHERAL VEIN, PERCUTANEOUS APPROACH: ICD-10-PCS | Performed by: EMERGENCY MEDICINE

## 2025-09-01 PROCEDURE — 96361 HYDRATE IV INFUSION ADD-ON: CPT

## 2025-09-01 PROCEDURE — 63600000 HC DRUGS/DETAIL CODE: Mod: JZ | Performed by: PHYSICIAN ASSISTANT

## 2025-09-01 PROCEDURE — 85025 COMPLETE CBC W/AUTO DIFF WBC: CPT | Performed by: PHYSICIAN ASSISTANT

## 2025-09-01 PROCEDURE — 36415 COLL VENOUS BLD VENIPUNCTURE: CPT | Performed by: PHYSICIAN ASSISTANT

## 2025-09-01 PROCEDURE — 96375 TX/PRO/DX INJ NEW DRUG ADDON: CPT

## 2025-09-01 PROCEDURE — 80053 COMPREHEN METABOLIC PANEL: CPT | Performed by: PHYSICIAN ASSISTANT

## 2025-09-01 PROCEDURE — 99284 EMERGENCY DEPT VISIT MOD MDM: CPT | Mod: 25

## 2025-09-01 RX ORDER — METOCLOPRAMIDE HYDROCHLORIDE 5 MG/ML
10 INJECTION INTRAMUSCULAR; INTRAVENOUS ONCE
Status: COMPLETED | OUTPATIENT
Start: 2025-09-01 | End: 2025-09-01

## 2025-09-01 RX ORDER — KETOROLAC TROMETHAMINE 15 MG/ML
15 INJECTION, SOLUTION INTRAMUSCULAR; INTRAVENOUS ONCE
Status: COMPLETED | OUTPATIENT
Start: 2025-09-01 | End: 2025-09-01

## 2025-09-01 RX ORDER — DIPHENHYDRAMINE HCL 50 MG/ML
25 VIAL (ML) INJECTION ONCE
Status: COMPLETED | OUTPATIENT
Start: 2025-09-01 | End: 2025-09-01

## 2025-09-01 RX ADMIN — SODIUM CHLORIDE 1000 ML: 9 INJECTION, SOLUTION INTRAVENOUS at 09:06

## 2025-09-01 RX ADMIN — DIPHENHYDRAMINE HYDROCHLORIDE 25 MG: 50 INJECTION INTRAMUSCULAR; INTRAVENOUS at 09:07

## 2025-09-01 RX ADMIN — KETOROLAC TROMETHAMINE 15 MG: 15 INJECTION, SOLUTION INTRAMUSCULAR; INTRAVENOUS at 10:57

## 2025-09-01 RX ADMIN — METOCLOPRAMIDE HYDROCHLORIDE 10 MG: 10 INJECTION, SOLUTION INTRAMUSCULAR; INTRAVENOUS at 09:14

## 2025-09-02 LAB — B BURGDOR AB SER IA-ACNC: 0.18 RATIO

## 2025-09-05 ENCOUNTER — OFFICE VISIT (OUTPATIENT)
Dept: FAMILY MEDICINE | Facility: CLINIC | Age: 65
End: 2025-09-05
Payer: MEDICARE

## 2025-09-05 VITALS
SYSTOLIC BLOOD PRESSURE: 118 MMHG | OXYGEN SATURATION: 98 % | DIASTOLIC BLOOD PRESSURE: 70 MMHG | HEIGHT: 68 IN | WEIGHT: 121.4 LBS | TEMPERATURE: 97.7 F | HEART RATE: 95 BPM | BODY MASS INDEX: 18.4 KG/M2 | RESPIRATION RATE: 18 BRPM

## 2025-09-05 DIAGNOSIS — J01.90 ACUTE NON-RECURRENT SINUSITIS, UNSPECIFIED LOCATION: Primary | ICD-10-CM

## 2025-09-05 PROCEDURE — 99214 OFFICE O/P EST MOD 30 MIN: CPT | Performed by: FAMILY MEDICINE

## 2025-09-05 PROCEDURE — G2211 COMPLEX E/M VISIT ADD ON: HCPCS | Performed by: FAMILY MEDICINE

## 2025-09-05 RX ORDER — AMOXICILLIN AND CLAVULANATE POTASSIUM 875; 125 MG/1; MG/1
1 TABLET, FILM COATED ORAL 2 TIMES DAILY
Qty: 14 TABLET | Refills: 0 | Status: SHIPPED | OUTPATIENT
Start: 2025-09-05 | End: 2025-09-12